# Patient Record
Sex: FEMALE | Race: WHITE | NOT HISPANIC OR LATINO | Employment: FULL TIME | URBAN - METROPOLITAN AREA
[De-identification: names, ages, dates, MRNs, and addresses within clinical notes are randomized per-mention and may not be internally consistent; named-entity substitution may affect disease eponyms.]

---

## 2021-03-12 ENCOUNTER — INITIAL PRENATAL (OUTPATIENT)
Dept: OBSTETRICS AND GYNECOLOGY | Facility: CLINIC | Age: 31
End: 2021-03-12

## 2021-03-12 VITALS
BODY MASS INDEX: 30.66 KG/M2 | DIASTOLIC BLOOD PRESSURE: 84 MMHG | WEIGHT: 219 LBS | SYSTOLIC BLOOD PRESSURE: 140 MMHG | HEIGHT: 71 IN

## 2021-03-12 DIAGNOSIS — Z3A.09 9 WEEKS GESTATION OF PREGNANCY: Primary | ICD-10-CM

## 2021-03-12 DIAGNOSIS — R03.0 ELEVATED BLOOD PRESSURE READING IN OFFICE WITH WHITE COAT SYNDROME, WITHOUT DIAGNOSIS OF HYPERTENSION: ICD-10-CM

## 2021-03-12 LAB
GLUCOSE UR STRIP-MCNC: NEGATIVE MG/DL
PROT UR STRIP-MCNC: NEGATIVE MG/DL

## 2021-03-12 PROCEDURE — 0501F PRENATAL FLOW SHEET: CPT | Performed by: OBSTETRICS & GYNECOLOGY

## 2021-03-12 NOTE — PROGRESS NOTES
"   Initial ob visit     CC- Here for care of pregnancy        Desiree Cheung is a 30 y.o. female, , who presents for her first obstetrical visit.  Her last LMP was Patient's last menstrual period was 2021 (exact date)..    Pt is very anxious today.  NOB packet was reviewed no questions.     She is having nausea, no vomiting.    OB History    Para Term  AB Living   1             SAB TAB Ectopic Molar Multiple Live Births             0      # Outcome Date GA Lbr Mukund/2nd Weight Sex Delivery Anes PTL Lv   1 Current                  Prior obstetric issues, potential pregnancy concerns: no  Family history of genetic issues (includes FOB): no  Prior infections concerning in pregnancy (Rash, fever in last 2 weeks): no  Varicella Hx -no  Prior testing for Cystic Fibrosis Carrier or Sickle Cell Trait- no  Prepregnancy BMI - Body mass index is 30.54 kg/m².  History of STD: no    Additional Pertinent History   Last Pap :   Last Completed Pap Smear       Status Date      PAP SMEAR No completions recorded        History of abnormal Pap smear: no  Family history of uterine, colon, breast, or ovarian cancer: no    Exercises Regularly:   Feelings of Anxiety or Depression: patient visibly agitated, says \"I just don't like doctors\"  Tobacco Usage?: No   Alcohol/Drug Use?: NO  Over the age of 35 at delivery: no  Desires Genetic Screening: uncertain    Patient's preferred name: Desiree    Occupation: Accounting  FOB's name: Abdirahman Cheung     Occupation:     [employer]  History reviewed. No pertinent past medical history.  No current outpatient medications on file.    The additional following portions of the patient's history were reviewed and updated as appropriate: allergies, current medications, past family history, past medical history, past social history, past surgical history and problem list.    Review of Systems   Review of Systems  Current obstetric complaints : Nausea   All systems reviewed and " "otherwise normal.    I have reviewed and agree with the HPI, ROS, and historical information as entered above. Ginna Isaacs MD    /84   Ht 180.3 cm (71\")   Wt 99.3 kg (219 lb)   LMP 2021 (Exact Date)   BMI 30.54 kg/m²     Physical Exam  General Appearance:    Alert, cooperative, in no acute distress,    Head:    Normocephalic, without obvious abnormality, atraumatic   Neck:   No adenopathy, supple, trachea midline, no thyromegaly   Back:     No kyphosis present, no scoliosis present,                       Lungs:     Clear to auscultation,respirations regular, even and     unlabored    Heart:    Regular rhythm and normal rate, normal S1 and S2, no            murmur, no gallop, no rub, no click       Abdomen:     Normal bowel sounds, no masses, no organomegaly, soft        non-tender, non-distended, no guarding, no rebound                 tenderness       Extremities:   Moves all extremities well, no edema, no cyanosis, no         redness   Pulses:   Pulses palpable and equal bilaterally   Skin:   No bleeding, bruising or rash   Lymph nodes:   No palpable adenopathy   Neurologic:   Sensation intact, A&O times 3      Physical Exam has been reviewed and confirmed by Ginna Isaacs MD    Objective   Lab Review   No data reviewed    Imaging   Pelvic ultrasound images independantly reviewed - IUP c/w dates    Assessment/Plan   ASSESSMENT  1. 30 y.o. year old  at 9w  2. Supervision of low risk pregnancy  3. Elevated BP without dx HTN today    PLAN  1. The problem list for pregnancy was initiated today  2. Tests ordered today:  Orders Placed This Encounter   Procedures   • Chlamydia trachomatis, Neisseria gonorrhoeae, PCR w/ confirmation - Urine, Urine, Clean Catch   • Urine Culture - Urine, Urine, Clean Catch   • Urine Drug Screen - Urine, Clean Catch     Please confirm all positive UDS   • HIV-1 / O / 2 Ag / Antibody 4th Generation   • Obstetric Panel   • POC Urinalysis Dipstick     3. Testing " for GC / Chlamydia ordered today  4. Genetic testing reviewed:  and she will consider the information and make a decision at a later date.  5. Information reviewed: exercise in pregnancy, nutrition in pregnancy, weight gain in pregnancy, work and travel restrictions during pregnancy, list of OTC medications acceptable in pregnancy and call coverage groups  6. Discussed new OB precautions for toxoplasmosis, dairy, heavy metals and diet/exercise.      Follow up: 4 week(s)       This note was electronically signed.    Ginna Isaacs MD  March 12, 2021

## 2021-03-15 LAB
ABO GROUP BLD: ABNORMAL
AMPHETAMINES UR QL SCN: NEGATIVE NG/ML
BACTERIA UR CULT: NORMAL
BACTERIA UR CULT: NORMAL
BARBITURATES UR QL SCN: NEGATIVE NG/ML
BASOPHILS # BLD AUTO: 0 X10E3/UL (ref 0–0.2)
BASOPHILS NFR BLD AUTO: 0 %
BENZODIAZ UR QL SCN: NEGATIVE NG/ML
BLD GP AB SCN SERPL QL: NEGATIVE
BZE UR QL SCN: NEGATIVE NG/ML
C TRACH RRNA SPEC QL NAA+PROBE: NEGATIVE
CANNABINOIDS UR QL SCN: NEGATIVE NG/ML
CREAT UR-MCNC: 173.6 MG/DL (ref 20–300)
EOSINOPHIL # BLD AUTO: 0 X10E3/UL (ref 0–0.4)
EOSINOPHIL NFR BLD AUTO: 0 %
ERYTHROCYTE [DISTWIDTH] IN BLOOD BY AUTOMATED COUNT: 12.8 % (ref 11.7–15.4)
HBV SURFACE AG SERPL QL IA: NEGATIVE
HCT VFR BLD AUTO: 40.4 % (ref 34–46.6)
HCV AB S/CO SERPL IA: <0.1 S/CO RATIO (ref 0–0.9)
HGB BLD-MCNC: 13.4 G/DL (ref 11.1–15.9)
HIV 1+2 AB+HIV1 P24 AG SERPL QL IA: NON REACTIVE
IMM GRANULOCYTES # BLD AUTO: 0 X10E3/UL (ref 0–0.1)
IMM GRANULOCYTES NFR BLD AUTO: 0 %
LABORATORY COMMENT REPORT: NORMAL
LYMPHOCYTES # BLD AUTO: 2.6 X10E3/UL (ref 0.7–3.1)
LYMPHOCYTES NFR BLD AUTO: 25 %
MCH RBC QN AUTO: 29.8 PG (ref 26.6–33)
MCHC RBC AUTO-ENTMCNC: 33.2 G/DL (ref 31.5–35.7)
MCV RBC AUTO: 90 FL (ref 79–97)
METHADONE UR QL SCN: NEGATIVE NG/ML
MONOCYTES # BLD AUTO: 0.4 X10E3/UL (ref 0.1–0.9)
MONOCYTES NFR BLD AUTO: 4 %
N GONORRHOEA RRNA SPEC QL NAA+PROBE: NEGATIVE
NEUTROPHILS # BLD AUTO: 7.3 X10E3/UL (ref 1.4–7)
NEUTROPHILS NFR BLD AUTO: 71 %
OPIATES UR QL SCN: NEGATIVE NG/ML
OXYCODONE+OXYMORPHONE UR QL SCN: NEGATIVE NG/ML
PCP UR QL: NEGATIVE NG/ML
PH UR: 5.8 [PH] (ref 4.5–8.9)
PLATELET # BLD AUTO: 154 X10E3/UL (ref 150–450)
PROPOXYPH UR QL SCN: NEGATIVE NG/ML
RBC # BLD AUTO: 4.5 X10E6/UL (ref 3.77–5.28)
RH BLD: POSITIVE
RPR SER QL: NON REACTIVE
RUBV IGG SERPL IA-ACNC: 3.65 INDEX
WBC # BLD AUTO: 10.4 X10E3/UL (ref 3.4–10.8)

## 2021-04-09 ENCOUNTER — ROUTINE PRENATAL (OUTPATIENT)
Dept: OBSTETRICS AND GYNECOLOGY | Facility: CLINIC | Age: 31
End: 2021-04-09

## 2021-04-09 VITALS — SYSTOLIC BLOOD PRESSURE: 160 MMHG | BODY MASS INDEX: 30.4 KG/M2 | WEIGHT: 218 LBS | DIASTOLIC BLOOD PRESSURE: 100 MMHG

## 2021-04-09 DIAGNOSIS — O16.2 ELEVATED BLOOD PRESSURE AFFECTING PREGNANCY IN SECOND TRIMESTER, ANTEPARTUM: ICD-10-CM

## 2021-04-09 DIAGNOSIS — Z3A.13 13 WEEKS GESTATION OF PREGNANCY: Primary | ICD-10-CM

## 2021-04-09 LAB
GLUCOSE UR STRIP-MCNC: NEGATIVE MG/DL
PROT UR STRIP-MCNC: NEGATIVE MG/DL

## 2021-04-09 PROCEDURE — 0502F SUBSEQUENT PRENATAL CARE: CPT | Performed by: NURSE PRACTITIONER

## 2021-04-09 RX ORDER — NIFEDIPINE 30 MG/1
30 TABLET, EXTENDED RELEASE ORAL DAILY
Qty: 30 TABLET | Refills: 6 | Status: SHIPPED | OUTPATIENT
Start: 2021-04-09 | End: 2021-05-29

## 2021-04-09 NOTE — PROGRESS NOTES
OB FOLLOW UP    Desiree Cheung is a 30 y.o.  13w6d patient being seen today for her obstetrical follow up visit. Patient reports no complaints. Pt gets very nervous at the doc office, and states that is why BP elevated. She denies h/o HTN. No c/o    Her prenatal care is complicated by (and status) :    Patient Active Problem List   Diagnosis   • 9 weeks gestation of pregnancy   • Elevated blood pressure reading in office with white coat syndrome, without diagnosis of hypertension       ROS -   Patient Reports : No Problems  Patient Denies: Loss of Fluid, Vaginal Spotting, Vision Changes, Headaches and Cramping/Contractions   Fetal Movement : not at this gestation      /100   Wt 98.9 kg (218 lb)   LMP 2021 (Exact Date)   BMI 30.40 kg/m²     Ultrasound Today: no    EXAM:  Vitals: See prenatal flowsheet       Urine glucose/protein: See prenatal flowsheet         Assessment    1. Pregnancy at 13w6d  2. Fetal status reassuring   3. Elevated blood pressure    Problem List Items Addressed This Visit     None      Visit Diagnoses     13 weeks gestation of pregnancy    -  Primary    Relevant Orders    POC Urinalysis Dipstick, Automated (Completed)    Comprehensive Metabolic Panel    Protein, Urine, 24 Hour - Urine, Clean Catch    Elevated blood pressure affecting pregnancy in second trimester, antepartum        Relevant Orders    Comprehensive Metabolic Panel    Protein, Urine, 24 Hour - Urine, Clean Catch          Plan    1. Start Procardia XL 30 mg qd, check baseline labs, 24 hr urine, start baby ASA  2. Declines cf DNA testing, but may be interested in Quad screen  3. RTC in 2 weeks to follow up on blood pressure.         RUTH ANN Rushing  2021

## 2021-04-10 LAB
ALBUMIN SERPL-MCNC: 4.1 G/DL (ref 3.5–5.2)
ALBUMIN/GLOB SERPL: 1.6 G/DL
ALP SERPL-CCNC: 51 U/L (ref 39–117)
ALT SERPL-CCNC: 26 U/L (ref 1–33)
AST SERPL-CCNC: 21 U/L (ref 1–32)
BILIRUB SERPL-MCNC: 0.3 MG/DL (ref 0–1.2)
BUN SERPL-MCNC: 6 MG/DL (ref 6–20)
BUN/CREAT SERPL: 9.4 (ref 7–25)
CALCIUM SERPL-MCNC: 9.7 MG/DL (ref 8.6–10.5)
CHLORIDE SERPL-SCNC: 104 MMOL/L (ref 98–107)
CO2 SERPL-SCNC: 23.5 MMOL/L (ref 22–29)
CREAT SERPL-MCNC: 0.64 MG/DL (ref 0.57–1)
GLOBULIN SER CALC-MCNC: 2.5 GM/DL
GLUCOSE SERPL-MCNC: 101 MG/DL (ref 65–99)
POTASSIUM SERPL-SCNC: 4.2 MMOL/L (ref 3.5–5.2)
PROT SERPL-MCNC: 6.6 G/DL (ref 6–8.5)
SODIUM SERPL-SCNC: 140 MMOL/L (ref 136–145)

## 2021-04-14 LAB
PROT 24H UR-MRATE: 229 MG/24 HR (ref 30–150)
PROT UR-MCNC: 12.4 MG/DL

## 2021-04-23 ENCOUNTER — ROUTINE PRENATAL (OUTPATIENT)
Dept: OBSTETRICS AND GYNECOLOGY | Facility: CLINIC | Age: 31
End: 2021-04-23

## 2021-04-23 VITALS — DIASTOLIC BLOOD PRESSURE: 92 MMHG | SYSTOLIC BLOOD PRESSURE: 160 MMHG | WEIGHT: 218 LBS | BODY MASS INDEX: 30.4 KG/M2

## 2021-04-23 DIAGNOSIS — Z3A.15 15 WEEKS GESTATION OF PREGNANCY: Primary | ICD-10-CM

## 2021-04-23 DIAGNOSIS — R03.0 ELEVATED BLOOD PRESSURE READING IN OFFICE WITH WHITE COAT SYNDROME, WITHOUT DIAGNOSIS OF HYPERTENSION: ICD-10-CM

## 2021-04-23 DIAGNOSIS — Z34.82 PRENATAL CARE, SUBSEQUENT PREGNANCY, SECOND TRIMESTER: ICD-10-CM

## 2021-04-23 LAB
GLUCOSE UR STRIP-MCNC: NEGATIVE MG/DL
PROT UR STRIP-MCNC: NEGATIVE MG/DL

## 2021-04-23 PROCEDURE — 0502F SUBSEQUENT PRENATAL CARE: CPT | Performed by: OBSTETRICS & GYNECOLOGY

## 2021-04-23 NOTE — PROGRESS NOTES
OB FOLLOW UP    Desiree hCeung is a 30 y.o.  15w6d patient being seen today for her obstetrical follow up visit. Patient reports no complaints. Pt feels well. BP @ home this AM before coming to appt was 123/72. She keeps a BP log and all wnl so patient has not taken BP med.    Her prenatal care is complicated by (and status) :    Patient Active Problem List   Diagnosis   • 9 weeks gestation of pregnancy   • Elevated blood pressure reading in office with white coat syndrome, without diagnosis of hypertension   • 15 weeks gestation of pregnancy   • Prenatal care, subsequent pregnancy, second trimester       ROS -   Patient Reports : No Problems  Patient Denies: Loss of Fluid, Vaginal Spotting, Vision Changes, Headaches and Cramping/Contractions   Fetal Movement : not at this gestation      /92   Wt 98.9 kg (218 lb)   LMP 2021 (Exact Date)   BMI 30.40 kg/m²     Ultrasound Today: no    EXAM:  Vitals: See prenatal flowsheet   Abdomen: See prenatal flowsheet   Urine glucose/protein: See prenatal flowsheet   Pelvic: See prenatal flowsheet     Assessment    1. Pregnancy at 15w6d  2. Fetal status reassuring     Problem List Items Addressed This Visit     Elevated blood pressure reading in office with white coat syndrome, without diagnosis of hypertension    15 weeks gestation of pregnancy - Primary    Relevant Orders    POC Urinalysis Dipstick, Automated (Completed)    Maternal Screen 4    Prenatal care, subsequent pregnancy, second trimester    Relevant Orders    Maternal Screen 4          Plan    1. Fetal movement/ Labor Precautions  2. Wants AFP today  3. Discussed elevated BP.  Patient is visibly trembling and she is bouncing her leg up and down.  States fear of doctor's office from being held down and given shots as a kid.  We discussed possibly treating anxiety.  Encouraged her to take benadryl prior to appt. Next visit.  If it works, I can given vistaril.  Also, she says she does better if  her friend, Kassandra, can come to appt.  She will come next time as sono visit.  Activity recommendation : 150 minutes/week of moderate intensity aerobic activity unless we limit for bleeding, hypertension or other pregnancy complication   Follow Up: Return in about 4 weeks (around 5/21/2021) for WITH SONO.  Reviewed Pre-eclampsia signs/symptoms        Ginna Isaacs MD  04/23/2021

## 2021-04-26 LAB
2ND TRIMESTER 4 SCREEN SERPL-IMP: NORMAL
AFP ADJ MOM SERPL: 1.01
AFP SERPL-MCNC: 27.3 NG/ML
AGE AT DELIVERY: 32.8 YR
FET TS 18 RISK FROM MAT AGE: NORMAL
FET TS 21 RISK FROM MAT AGE: 460
GA METHOD: NORMAL
GA: 15.9 WEEKS
HCG ADJ MOM SERPL: 1.5
HCG SERPL-ACNC: NORMAL MIU/ML
IDDM PATIENT QL: NO
INHIBIN A ADJ MOM SERPL: 0.8
INHIBIN A SERPL-MCNC: 103.71 PG/ML
MULTIPLE PREGNANCY: NO
NEURAL TUBE DEFECT RISK FETUS: NORMAL %
SERVICE CMNT-IMP: NORMAL
TS 18 RISK FETUS: NORMAL
TS 21 RISK FETUS: 9714
U ESTRIOL ADJ MOM SERPL: 1.4
U ESTRIOL SERPL-MCNC: 1.18 NG/ML

## 2021-05-21 ENCOUNTER — ROUTINE PRENATAL (OUTPATIENT)
Dept: OBSTETRICS AND GYNECOLOGY | Facility: CLINIC | Age: 31
End: 2021-05-21

## 2021-05-21 VITALS — DIASTOLIC BLOOD PRESSURE: 82 MMHG | WEIGHT: 217 LBS | BODY MASS INDEX: 30.27 KG/M2 | SYSTOLIC BLOOD PRESSURE: 128 MMHG

## 2021-05-21 DIAGNOSIS — R03.0 ELEVATED BLOOD PRESSURE READING IN OFFICE WITH WHITE COAT SYNDROME, WITHOUT DIAGNOSIS OF HYPERTENSION: ICD-10-CM

## 2021-05-21 DIAGNOSIS — Z3A.19 19 WEEKS GESTATION OF PREGNANCY: Primary | ICD-10-CM

## 2021-05-21 LAB
EXPIRATION DATE: NORMAL
GLUCOSE UR STRIP-MCNC: NEGATIVE MG/DL
Lab: NORMAL
PROT UR STRIP-MCNC: NEGATIVE MG/DL

## 2021-05-21 PROCEDURE — 0502F SUBSEQUENT PRENATAL CARE: CPT | Performed by: OBSTETRICS & GYNECOLOGY

## 2021-05-28 PROBLEM — Z3A.15 15 WEEKS GESTATION OF PREGNANCY: Status: RESOLVED | Noted: 2021-04-23 | Resolved: 2021-05-28

## 2021-05-28 PROBLEM — Z3A.19 19 WEEKS GESTATION OF PREGNANCY: Status: ACTIVE | Noted: 2021-05-28

## 2021-05-28 PROBLEM — Z3A.09 9 WEEKS GESTATION OF PREGNANCY: Status: RESOLVED | Noted: 2021-03-12 | Resolved: 2021-05-28

## 2021-06-25 ENCOUNTER — ROUTINE PRENATAL (OUTPATIENT)
Dept: OBSTETRICS AND GYNECOLOGY | Facility: CLINIC | Age: 31
End: 2021-06-25

## 2021-06-25 VITALS — SYSTOLIC BLOOD PRESSURE: 136 MMHG | DIASTOLIC BLOOD PRESSURE: 84 MMHG | BODY MASS INDEX: 30.63 KG/M2 | WEIGHT: 219.6 LBS

## 2021-06-25 DIAGNOSIS — Z3A.25 25 WEEKS GESTATION OF PREGNANCY: Primary | ICD-10-CM

## 2021-06-25 LAB
EXPIRATION DATE: 0
GLUCOSE UR STRIP-MCNC: NEGATIVE MG/DL
Lab: 0
PROT UR STRIP-MCNC: NEGATIVE MG/DL

## 2021-06-25 PROCEDURE — 0502F SUBSEQUENT PRENATAL CARE: CPT | Performed by: OBSTETRICS & GYNECOLOGY

## 2021-06-25 RX ORDER — PRENATAL VIT/IRON FUM/FOLIC AC 27MG-0.8MG
1 TABLET ORAL DAILY
COMMUNITY

## 2021-06-25 NOTE — PROGRESS NOTES
OB FOLLOW UP    Desiree Cheung is a 30 y.o.  24w6d patient being seen today for her obstetrical follow up visit. Patient reports no complaints. 28 week prenatal visit instruction sheet discussed. Pt verbalized understanding.    Her prenatal care is complicated by (and status) :    Patient Active Problem List   Diagnosis   • Elevated blood pressure reading in office with white coat syndrome, without diagnosis of hypertension   • Prenatal care, subsequent pregnancy, second trimester   • 19 weeks gestation of pregnancy       ROS -   Patient Reports : No Problems  Patient Denies: Loss of Fluid, Vaginal Spotting, Vision Changes, Headaches and Cramping/Contractions   Fetal Movement : normal      /84   Wt 99.6 kg (219 lb 9.6 oz)   LMP 2021 (Exact Date)   BMI 30.63 kg/m²     Ultrasound Today: no    EXAM:  Vitals: See prenatal flowsheet   Abdomen: See prenatal flowsheet   Urine glucose/protein: See prenatal flowsheet   Pelvic: See prenatal flowsheet     Assessment    Diagnoses and all orders for this visit:    1. 25 weeks gestation of pregnancy (Primary)  -     POC Glucose, Urine, Qualitative, Dipstick  -     POC Protein, Urine, Qualitative, Dipstick        1. Pregnancy at 24w6d  2. Fetal status reassuring     Problem List Items Addressed This Visit     None      Visit Diagnoses     25 weeks gestation of pregnancy    -  Primary    Relevant Orders    POC Glucose, Urine, Qualitative, Dipstick (Completed)    POC Protein, Urine, Qualitative, Dipstick (Completed)          Plan    1. Fetal movement/ Labor Precautions  Reviewed routine prenatal care with the office and educational materials given  Patient is on Prenatal vitamins  Activity recommendation : 150 minutes/week of moderate intensity aerobic activity unless we limit for bleeding, hypertension or other pregnancy complication   Reviewed Pre-eclampsia signs/symptoms  Follow Up: Return in about 3 weeks (around 2021) for Next scheduled follow  up.        Ginna Isaacs MD  06/25/2021

## 2021-07-30 ENCOUNTER — ROUTINE PRENATAL (OUTPATIENT)
Dept: OBSTETRICS AND GYNECOLOGY | Facility: CLINIC | Age: 31
End: 2021-07-30

## 2021-07-30 VITALS — WEIGHT: 215 LBS | DIASTOLIC BLOOD PRESSURE: 88 MMHG | BODY MASS INDEX: 29.99 KG/M2 | SYSTOLIC BLOOD PRESSURE: 136 MMHG

## 2021-07-30 DIAGNOSIS — Z34.82 PRENATAL CARE, SUBSEQUENT PREGNANCY, SECOND TRIMESTER: ICD-10-CM

## 2021-07-30 DIAGNOSIS — R03.0 ELEVATED BLOOD PRESSURE READING IN OFFICE WITH WHITE COAT SYNDROME, WITHOUT DIAGNOSIS OF HYPERTENSION: ICD-10-CM

## 2021-07-30 DIAGNOSIS — Z3A.29 29 WEEKS GESTATION OF PREGNANCY: Primary | ICD-10-CM

## 2021-07-30 DIAGNOSIS — O10.919 CHRONIC HYPERTENSION AFFECTING PREGNANCY: ICD-10-CM

## 2021-07-30 PROBLEM — Z3A.19 19 WEEKS GESTATION OF PREGNANCY: Status: RESOLVED | Noted: 2021-05-28 | Resolved: 2021-07-30

## 2021-07-30 LAB
GLUCOSE UR STRIP-MCNC: NEGATIVE MG/DL
PROT UR STRIP-MCNC: NEGATIVE MG/DL

## 2021-07-30 PROCEDURE — 90715 TDAP VACCINE 7 YRS/> IM: CPT | Performed by: OBSTETRICS & GYNECOLOGY

## 2021-07-30 PROCEDURE — 90471 IMMUNIZATION ADMIN: CPT | Performed by: OBSTETRICS & GYNECOLOGY

## 2021-07-30 PROCEDURE — 0502F SUBSEQUENT PRENATAL CARE: CPT | Performed by: OBSTETRICS & GYNECOLOGY

## 2021-07-31 LAB
BLD GP AB SCN SERPL QL: NEGATIVE
ERYTHROCYTE [DISTWIDTH] IN BLOOD BY AUTOMATED COUNT: 12.4 % (ref 12.3–15.4)
GLUCOSE 1H P 50 G GLC PO SERPL-MCNC: 87 MG/DL (ref 65–139)
HCT VFR BLD AUTO: 39.1 % (ref 34–46.6)
HGB BLD-MCNC: 13.3 G/DL (ref 12–15.9)
MCH RBC QN AUTO: 30.2 PG (ref 26.6–33)
MCHC RBC AUTO-ENTMCNC: 34 G/DL (ref 31.5–35.7)
MCV RBC AUTO: 88.9 FL (ref 79–97)
PLATELET # BLD AUTO: 138 10*3/MM3 (ref 140–450)
RBC # BLD AUTO: 4.4 10*6/MM3 (ref 3.77–5.28)
WBC # BLD AUTO: 9.93 10*3/MM3 (ref 3.4–10.8)

## 2021-08-18 ENCOUNTER — ROUTINE PRENATAL (OUTPATIENT)
Dept: OBSTETRICS AND GYNECOLOGY | Facility: CLINIC | Age: 31
End: 2021-08-18

## 2021-08-18 VITALS — WEIGHT: 213.2 LBS | BODY MASS INDEX: 29.74 KG/M2 | SYSTOLIC BLOOD PRESSURE: 128 MMHG | DIASTOLIC BLOOD PRESSURE: 82 MMHG

## 2021-08-18 DIAGNOSIS — Z3A.32 32 WEEKS GESTATION OF PREGNANCY: Primary | ICD-10-CM

## 2021-08-18 PROCEDURE — 0502F SUBSEQUENT PRENATAL CARE: CPT | Performed by: NURSE PRACTITIONER

## 2021-08-18 RX ORDER — ASPIRIN 81 MG/1
81 TABLET ORAL DAILY
COMMUNITY
End: 2021-08-19 | Stop reason: ALTCHOICE

## 2021-08-18 NOTE — PROGRESS NOTES
OB FOLLOW UP  CC- Here for care of pregnancy        Desiree Cheung is a 30 y.o.  32w4d patient being seen today for her obstetrical follow up visit. Patient reports no complaints.     She denies LOF, vaginal spotting, headaches, vision changes, swelling or sonali dixon/contractions.  She reports adequate fetal movements, >10 movements in 10 hours.     Her prenatal care is complicated by (and status) :    Patient Active Problem List   Diagnosis   • Elevated blood pressure reading in office with white coat syndrome, without diagnosis of hypertension   • Prenatal care, subsequent pregnancy, second trimester   • 29 weeks gestation of pregnancy   • Chronic hypertension affecting pregnancy     Ultrasound Today: Yes, f/u growth w/ BPP.  Findings showed AC 6%.  I have personally evaluated the U/S and agree with the findings. RUTH ANN Espino    ROS -   Patient Reports : No Problems  Patient Denies: Loss of Fluid, Vaginal Spotting, Vision Changes, Headaches, Nausea , Vomiting , Contractions and Epigastric pain  Fetal Movement : >10 movements in 10 hours  All other systems reviewed and are negative.       The additional following portions of the patient's history were reviewed and updated as appropriate: allergies, current medications, past family history, past medical history, past social history, past surgical history and problem list.    I have reviewed and agree with the HPI, ROS, and historical information as entered above. RUTH ANN Espino    /82   Wt 96.7 kg (213 lb 3.2 oz)   LMP 2021 (Exact Date)   BMI 29.74 kg/m²       EXAM:     FHT: wnl BPM   Uterine Size: see US  Pelvic Exam: deferred    Urine glucose/protein: See prenatal flowsheet       Assessment and Plan    Problem List Items Addressed This Visit     None      Visit Diagnoses     32 weeks gestation of pregnancy    -  Primary    Relevant Orders    [facility]          1. Pregnancy at 32w4d  2. Fetal status  reassuring.  Enc rest, good po intake, push flds.  3. Activity and Exercise discussed.  Rev daily [facility], preecl prec, MARIA ELENA prec.  Return in about 1 week (around 8/25/2021) for PDC for AC 6% then SIRI.    Heaven Winter, APRN  08/18/2021

## 2021-08-19 ENCOUNTER — HOSPITAL ENCOUNTER (OUTPATIENT)
Dept: WOMENS IMAGING | Facility: HOSPITAL | Age: 31
Discharge: HOME | End: 2021-08-19
Admitting: NURSE PRACTITIONER

## 2021-08-19 ENCOUNTER — OFFICE VISIT (OUTPATIENT)
Dept: OBSTETRICS AND GYNECOLOGY | Facility: HOSPITAL | Age: 31
End: 2021-08-19

## 2021-08-19 VITALS
SYSTOLIC BLOOD PRESSURE: 131 MMHG | WEIGHT: 214 LBS | DIASTOLIC BLOOD PRESSURE: 75 MMHG | HEIGHT: 70 IN | BODY MASS INDEX: 30.64 KG/M2

## 2021-08-19 DIAGNOSIS — Z3A.32 32 WEEKS GESTATION OF PREGNANCY: ICD-10-CM

## 2021-08-19 DIAGNOSIS — O10.919 CHRONIC HYPERTENSION AFFECTING PREGNANCY: Primary | ICD-10-CM

## 2021-08-19 PROCEDURE — 76811 OB US DETAILED SNGL FETUS: CPT | Performed by: OBSTETRICS & GYNECOLOGY

## 2021-08-19 PROCEDURE — 76811 OB US DETAILED SNGL FETUS: CPT

## 2021-08-19 RX ORDER — ASPIRIN 81 MG/1
81 TABLET, CHEWABLE ORAL DAILY
COMMUNITY

## 2021-08-19 NOTE — PROGRESS NOTES
Documentation of the ultasound findings, images, and interpretations will be available in the patient's Viewpoint report located in the Chart Review Imaging tab in [facility].

## 2021-08-20 ENCOUNTER — ROUTINE PRENATAL (OUTPATIENT)
Dept: OBSTETRICS AND GYNECOLOGY | Facility: CLINIC | Age: 31
End: 2021-08-20

## 2021-08-20 VITALS — WEIGHT: 214 LBS | SYSTOLIC BLOOD PRESSURE: 120 MMHG | DIASTOLIC BLOOD PRESSURE: 72 MMHG | BODY MASS INDEX: 30.93 KG/M2

## 2021-08-20 DIAGNOSIS — Z3A.32 32 WEEKS GESTATION OF PREGNANCY: Primary | ICD-10-CM

## 2021-08-20 DIAGNOSIS — Z34.82 PRENATAL CARE, SUBSEQUENT PREGNANCY, SECOND TRIMESTER: ICD-10-CM

## 2021-08-20 DIAGNOSIS — O16.3 ELEVATED BLOOD PRESSURE AFFECTING PREGNANCY IN THIRD TRIMESTER, ANTEPARTUM: ICD-10-CM

## 2021-08-20 DIAGNOSIS — R03.0 ELEVATED BLOOD PRESSURE READING IN OFFICE WITH WHITE COAT SYNDROME, WITHOUT DIAGNOSIS OF HYPERTENSION: ICD-10-CM

## 2021-08-20 PROBLEM — O10.919 CHRONIC HYPERTENSION AFFECTING PREGNANCY: Status: RESOLVED | Noted: 2021-07-30 | Resolved: 2021-08-20

## 2021-08-20 LAB
GLUCOSE UR STRIP-MCNC: NEGATIVE MG/DL
PROT UR STRIP-MCNC: NEGATIVE MG/DL

## 2021-08-20 PROCEDURE — 0502F SUBSEQUENT PRENATAL CARE: CPT | Performed by: OBSTETRICS & GYNECOLOGY

## 2021-08-20 NOTE — PROGRESS NOTES
OB FOLLOW UP    Desiree Cheung is a 30 y.o.  32w6d patient being seen today for her obstetrical follow up visit. Patient reports no complaints. Pt feels well (stressed) good fm, no other c/p. BPP  @ PDC 21.    Her prenatal care is complicated by (and status) :    Patient Active Problem List   Diagnosis   • Elevated blood pressure reading in office with white coat syndrome, without diagnosis of hypertension   • Prenatal care, subsequent pregnancy, second trimester   • 32 weeks gestation of pregnancy   • Elevated blood pressure affecting pregnancy in third trimester, antepartum       ROS -   Patient Reports : No Problems  Patient Denies: Loss of Fluid, Vaginal Spotting, Vision Changes, Headaches and Cramping/Contractions   Fetal Movement : normal      /72   Wt 97.1 kg (214 lb)   LMP 2021 (Exact Date)   BMI 30.93 kg/m²     Ultrasound Today: no    EXAM:  Vitals: See prenatal flowsheet   Abdomen: See prenatal flowsheet   Urine glucose/protein: See prenatal flowsheet   Pelvic: See prenatal flowsheet     Assessment    Diagnoses and all orders for this visit:    1. 32 weeks gestation of pregnancy (Primary)  -     POC Urinalysis Dipstick, Automated    2. Elevated blood pressure affecting pregnancy in third trimester, antepartum  -     US Ob Follow Up Transabdominal Approach; Future    3. Elevated blood pressure reading in office with white coat syndrome, without diagnosis of hypertension    4. Prenatal care, subsequent pregnancy, second trimester        1. Pregnancy at 32w6d  2. Fetal status reassuring     Problem List Items Addressed This Visit     Elevated blood pressure reading in office with white coat syndrome, without diagnosis of hypertension    Prenatal care, subsequent pregnancy, second trimester    32 weeks gestation of pregnancy - Primary    Relevant Orders    POC Urinalysis Dipstick, Automated (Completed)    Elevated blood pressure affecting pregnancy in third trimester, antepartum     Relevant Orders    US Ob Follow Up Transabdominal Approach          Plan    1. Fetal movement/ Labor Precautions  2. Reviewed growth normal and small AC, will repeat 4 weeks  Discussed/encouraged social distancing/COVID19 precautions; encouraged vaccination when able  Diet/exercise precautions  Follow Up: Return in about 2 weeks (around 9/3/2021) for Next scheduled follow up.        Ginna Isaacs MD  2021

## 2021-09-03 ENCOUNTER — ROUTINE PRENATAL (OUTPATIENT)
Dept: OBSTETRICS AND GYNECOLOGY | Facility: CLINIC | Age: 31
End: 2021-09-03

## 2021-09-03 VITALS — BODY MASS INDEX: 30.93 KG/M2 | WEIGHT: 214 LBS | SYSTOLIC BLOOD PRESSURE: 128 MMHG | DIASTOLIC BLOOD PRESSURE: 80 MMHG

## 2021-09-03 DIAGNOSIS — Z3A.35 35 WEEKS GESTATION OF PREGNANCY: Primary | ICD-10-CM

## 2021-09-03 LAB
GLUCOSE UR STRIP-MCNC: ABNORMAL MG/DL
PROT UR STRIP-MCNC: NEGATIVE MG/DL

## 2021-09-03 PROCEDURE — 0502F SUBSEQUENT PRENATAL CARE: CPT | Performed by: OBSTETRICS & GYNECOLOGY

## 2021-09-03 NOTE — PROGRESS NOTES
OB FOLLOW UP    Desiree Cheung is a 30 y.o.  34w6d patient being seen today for her obstetrical follow up visit. Patient reports no complaints.     Her prenatal care is complicated by (and status) :    Patient Active Problem List   Diagnosis   • Elevated blood pressure reading in office with white coat syndrome, without diagnosis of hypertension   • Prenatal care, subsequent pregnancy, second trimester   • 32 weeks gestation of pregnancy   • Elevated blood pressure affecting pregnancy in third trimester, antepartum   • 35 weeks gestation of pregnancy       ROS -   Patient Reports : no complaints  Patient Denies: Loss of Fluid, Vaginal Spotting and Vision Changes  Fetal Movement : normal      /80   Wt 97.1 kg (214 lb)   LMP 2021 (Exact Date)   BMI 30.93 kg/m²     Ultrasound Today: no    EXAM:  Vitals: See prenatal flowsheet   Abdomen: See prenatal flowsheet   Urine glucose/protein: See prenatal flowsheet   Pelvic: See prenatal flowsheet     Non Stress Test: minutes >20min  non-stress test: NST: Reactive  indication: Hypertension  category: Category I      Assessment    Diagnoses and all orders for this visit:    1. 35 weeks gestation of pregnancy (Primary)  -     POC Urinalysis Dipstick    Other orders  -     SCANNED - PROCEDURE        1. Pregnancy at 34w6d  2. Fetal status reassuring     Problem List Items Addressed This Visit     35 weeks gestation of pregnancy - Primary    Relevant Orders    POC Urinalysis Dipstick (Completed)          Plan    1. Fetal movement/ Labor Precautions  2. I want to believe that patient's hypertension is just whitecoat syndrome.  However with baby's growth being somewhat lagging, I think we should monitor a little closer anyway and so we will continue doing NSTs weekly, BPP and growth in 2 weeks.  Diet/exercise precautions  Reviewed Pre-eclampsia signs/symptoms  Follow Up: Return in about 2 weeks (around 2021) for WITH TALI Isaacs,  MD  09/03/2021

## 2021-09-10 ENCOUNTER — ROUTINE PRENATAL (OUTPATIENT)
Dept: OBSTETRICS AND GYNECOLOGY | Facility: CLINIC | Age: 31
End: 2021-09-10

## 2021-09-10 VITALS — DIASTOLIC BLOOD PRESSURE: 92 MMHG | BODY MASS INDEX: 31.22 KG/M2 | WEIGHT: 216 LBS | SYSTOLIC BLOOD PRESSURE: 130 MMHG

## 2021-09-10 DIAGNOSIS — R03.0 ELEVATED BLOOD PRESSURE READING IN OFFICE WITH WHITE COAT SYNDROME, WITHOUT DIAGNOSIS OF HYPERTENSION: ICD-10-CM

## 2021-09-10 DIAGNOSIS — Z3A.35 35 WEEKS GESTATION OF PREGNANCY: Primary | ICD-10-CM

## 2021-09-10 DIAGNOSIS — O16.3 ELEVATED BLOOD PRESSURE AFFECTING PREGNANCY IN THIRD TRIMESTER, ANTEPARTUM: ICD-10-CM

## 2021-09-10 LAB
GLUCOSE UR STRIP-MCNC: NEGATIVE MG/DL
PROT UR STRIP-MCNC: NEGATIVE MG/DL

## 2021-09-10 PROCEDURE — 0502F SUBSEQUENT PRENATAL CARE: CPT | Performed by: OBSTETRICS & GYNECOLOGY

## 2021-09-10 NOTE — PROGRESS NOTES
OB FOLLOW UP    Desiree Cheung is a 30 y.o.  35w6d patient being seen today for her obstetrical follow up visit. Patient reports no complaints.     Her prenatal care is complicated by (and status) :    Patient Active Problem List   Diagnosis   • Elevated blood pressure reading in office with white coat syndrome, without diagnosis of hypertension   • Prenatal care, subsequent pregnancy, second trimester   • 32 weeks gestation of pregnancy   • Elevated blood pressure affecting pregnancy in third trimester, antepartum   • 35 weeks gestation of pregnancy       ROS -   Patient Reports : No Problems  Patient Denies: Loss of Fluid, Vaginal Spotting and Vision Changes  Fetal Movement : normal      /92   Wt 98 kg (216 lb)   LMP 2021 (Exact Date)   BMI 31.22 kg/m²     Ultrasound Today: no    EXAM:  Vitals: See prenatal flowsheet   Abdomen: See prenatal flowsheet   Urine glucose/protein: See prenatal flowsheet   Pelvic: See prenatal flowsheet     Assessment    Diagnoses and all orders for this visit:    1. 35 weeks gestation of pregnancy (Primary)  -     POC Urinalysis Dipstick  -     Group B Streptococcus Culture - Swab, Vaginal/Rectum  -     Preeclampsia Panel  -     CBC (No Diff)  -     US Ob Follow Up Transabdominal Approach; Future  -     US Fetal Biophysical Profile;Without Non-Stress Testing; Future    2. Elevated blood pressure reading in office with white coat syndrome, without diagnosis of hypertension  -     Protein, Urine, 24 Hour - Urine, Clean Catch; Future  -     Creatinine, Urine, 24 Hour - Urine, Clean Catch; Future    3. Elevated blood pressure affecting pregnancy in third trimester, antepartum        1. Pregnancy at 35w6d  2. Fetal status reassuring     Problem List Items Addressed This Visit     Elevated blood pressure reading in office with white coat syndrome, without diagnosis of hypertension    Relevant Orders    Protein, Urine, 24 Hour - Urine, Clean Catch    Creatinine, Urine,  24 Hour - Urine, Clean Catch    Elevated blood pressure affecting pregnancy in third trimester, antepartum    35 weeks gestation of pregnancy - Primary    Relevant Orders    POC Urinalysis Dipstick (Completed)    Group B Streptococcus Culture - Swab, Vaginal/Rectum    Preeclampsia Panel    CBC (No Diff)    US Ob Follow Up Transabdominal Approach    US Fetal Biophysical Profile;Without Non-Stress Testing          Plan    1. Fetal movement/ Labor Precautions  2. BP progressively increased at visit, but patient visibly shaking and worrying about work.  She states BPs at home 100's/70's.  PEP, CBC today, will bring 24hr back Mon and f/u for sono/BPP on Tues and BP check.  3. GBS done.        Ginna Isaacs MD  09/10/2021

## 2021-09-13 LAB
ALT SERPL-CCNC: 12 IU/L (ref 0–32)
AST SERPL-CCNC: 17 IU/L (ref 0–40)
BASOPHILS # BLD AUTO: 0 X10E3/UL (ref 0–0.2)
BASOPHILS NFR BLD AUTO: 0 %
BUN SERPL-MCNC: 7 MG/DL (ref 6–20)
CREAT SERPL-MCNC: 0.54 MG/DL (ref 0.57–1)
CREAT UR-MCNC: ABNORMAL MG/DL
EOSINOPHIL # BLD AUTO: 0 X10E3/UL (ref 0–0.4)
EOSINOPHIL NFR BLD AUTO: 0 %
ERYTHROCYTE [DISTWIDTH] IN BLOOD BY AUTOMATED COUNT: 13.1 % (ref 11.7–15.4)
HCT VFR BLD AUTO: 43.6 % (ref 34–46.6)
HGB BLD-MCNC: 14.3 G/DL (ref 11.1–15.9)
IMM GRANULOCYTES # BLD AUTO: 0 X10E3/UL (ref 0–0.1)
IMM GRANULOCYTES NFR BLD AUTO: 0 %
LDH SERPL-CCNC: 140 IU/L (ref 119–226)
LYMPHOCYTES # BLD AUTO: 2.3 X10E3/UL (ref 0.7–3.1)
LYMPHOCYTES NFR BLD AUTO: 23 %
MCH RBC QN AUTO: 29.9 PG (ref 26.6–33)
MCHC RBC AUTO-ENTMCNC: 32.8 G/DL (ref 31.5–35.7)
MCV RBC AUTO: 91 FL (ref 79–97)
MICROALBUMIN UR-MCNC: ABNORMAL
MONOCYTES # BLD AUTO: 0.5 X10E3/UL (ref 0.1–0.9)
MONOCYTES NFR BLD AUTO: 5 %
NEUTROPHILS # BLD AUTO: 7.1 X10E3/UL (ref 1.4–7)
NEUTROPHILS NFR BLD AUTO: 72 %
PLATELET # BLD AUTO: 131 X10E3/UL (ref 150–450)
RBC # BLD AUTO: 4.78 X10E6/UL (ref 3.77–5.28)
URATE SERPL-MCNC: 3.6 MG/DL (ref 2.6–6.2)
WBC # BLD AUTO: 10 X10E3/UL (ref 3.4–10.8)

## 2021-09-13 NOTE — PROGRESS NOTES
Likely c/w gestational thrombocytopenia.  Desiree Vasquez is coming Tues for BPP/growth and recheck BP

## 2021-09-14 ENCOUNTER — ROUTINE PRENATAL (OUTPATIENT)
Dept: OBSTETRICS AND GYNECOLOGY | Facility: CLINIC | Age: 31
End: 2021-09-14

## 2021-09-14 VITALS — BODY MASS INDEX: 30.49 KG/M2 | SYSTOLIC BLOOD PRESSURE: 120 MMHG | DIASTOLIC BLOOD PRESSURE: 82 MMHG | WEIGHT: 211 LBS

## 2021-09-14 DIAGNOSIS — O16.3 ELEVATED BLOOD PRESSURE AFFECTING PREGNANCY IN THIRD TRIMESTER, ANTEPARTUM: ICD-10-CM

## 2021-09-14 DIAGNOSIS — Z3A.36 36 WEEKS GESTATION OF PREGNANCY: Primary | ICD-10-CM

## 2021-09-14 LAB
GLUCOSE UR STRIP-MCNC: NEGATIVE MG/DL
PROT UR STRIP-MCNC: ABNORMAL MG/DL

## 2021-09-14 PROCEDURE — 0502F SUBSEQUENT PRENATAL CARE: CPT | Performed by: NURSE PRACTITIONER

## 2021-09-14 NOTE — PROGRESS NOTES
OB FOLLOW UP  CC- Here for care of pregnancy        Desiree Cheung is a 30 y.o.  36w3d patient being seen today for her obstetrical follow up visit. Patient reports no complaints.  Pt brought in her 24 hour urine today.     Her prenatal care is complicated by (and status) :    Patient Active Problem List   Diagnosis   • Elevated blood pressure reading in office with white coat syndrome, without diagnosis of hypertension   • Prenatal care, subsequent pregnancy, second trimester   • 32 weeks gestation of pregnancy   • Elevated blood pressure affecting pregnancy in third trimester, antepartum   • 35 weeks gestation of pregnancy       Flu Status: Desires at future appt  Ultrasound Today: Yes.  Findings showed BJ 10.7, EFW 5 14 oz, ,  I have personally evaluated the U/S and agree with the findings. RUTH ANN Hopson    ROS -   Patient Reports : No Problems  Patient Denies: Loss of Fluid, Vaginal Spotting, Vision Changes and Headaches  Fetal Movement : normal  All other systems reviewed and are negative.       The additional following portions of the patient's history were reviewed and updated as appropriate: past social history, past surgical history and problem list.    I have reviewed and agree with the HPI, ROS, and historical information as entered above. RUTH ANN Hopson    /82   Wt 95.7 kg (211 lb)   LMP 2021 (Exact Date)   BMI 30.49 kg/m²       EXAM:     FHT: 138 BPM   Uterine Size: size equals dates  Pelvic Exam: No    Urine glucose/protein: See prenatal flowsheet       Assessment and Plan    Problem List Items Addressed This Visit        Gravid and     Elevated blood pressure affecting pregnancy in third trimester, antepartum      Other Visit Diagnoses     36 weeks gestation of pregnancy    -  Primary    Relevant Orders    POC Urinalysis Dipstick (Completed)          1. Pregnancy at 36w3d  2. Fetal status reassuring.   3. Activity and Exercise discussed.  U/S today BPP=  8/8, EFW= 29%, AC 12%  24 hour urine brought in today  Reviewed with , can schedule next appointment for next Friday 9/24/21    RUTH ANN Hopson  09/14/2021

## 2021-09-15 LAB
B-HEM STREP SPEC QL CULT: NEGATIVE
CREAT 24H UR-MRATE: 1341 MG/24 HR (ref 800–1800)
CREAT UR-MCNC: 47.9 MG/DL
PROT 24H UR-MRATE: 171 MG/24 HR (ref 30–150)
PROT UR-MCNC: 6.1 MG/DL

## 2021-09-24 ENCOUNTER — ROUTINE PRENATAL (OUTPATIENT)
Dept: OBSTETRICS AND GYNECOLOGY | Facility: CLINIC | Age: 31
End: 2021-09-24

## 2021-09-24 VITALS — SYSTOLIC BLOOD PRESSURE: 138 MMHG | DIASTOLIC BLOOD PRESSURE: 92 MMHG | BODY MASS INDEX: 30.96 KG/M2 | WEIGHT: 214.2 LBS

## 2021-09-24 DIAGNOSIS — R03.0 ELEVATED BLOOD PRESSURE READING IN OFFICE WITH WHITE COAT SYNDROME, WITHOUT DIAGNOSIS OF HYPERTENSION: ICD-10-CM

## 2021-09-24 DIAGNOSIS — Z3A.38 38 WEEKS GESTATION OF PREGNANCY: Primary | ICD-10-CM

## 2021-09-24 LAB
EXPIRATION DATE: 0
GLUCOSE UR STRIP-MCNC: NEGATIVE MG/DL
Lab: 0
PROT UR STRIP-MCNC: NEGATIVE MG/DL

## 2021-09-24 PROCEDURE — 59025 FETAL NON-STRESS TEST: CPT | Performed by: NURSE PRACTITIONER

## 2021-09-24 PROCEDURE — 0502F SUBSEQUENT PRENATAL CARE: CPT | Performed by: NURSE PRACTITIONER

## 2021-09-24 NOTE — PROGRESS NOTES
OB FOLLOW UP  CC- Here for care of pregnancy        Desiree Cheung is a 30 y.o.  37w6d patient being seen today for her obstetrical follow up visit. Patient reports no complaints. States BP at home this morning was 115/70.     Her prenatal care is complicated by (and status) :    Patient Active Problem List   Diagnosis   • Elevated blood pressure reading in office with white coat syndrome, without diagnosis of hypertension   • Prenatal care, subsequent pregnancy, second trimester   • 32 weeks gestation of pregnancy   • Elevated blood pressure affecting pregnancy in third trimester, antepartum   • 35 weeks gestation of pregnancy         Flu Status: Declines  GBS Status: Was already done and it was negative.   Her Delivery Plan is: Does not desire IOL  Ultrasound Today: No.    ROS -   Patient Reports : No Problems  Patient Denies: Loss of Fluid, Vaginal Spotting, Vision Changes, Headaches, Nausea , Vomiting , Contractions and Epigastric pain  Fetal Movement : normal  All other systems reviewed and are negative.       The additional following portions of the patient's history were reviewed and updated as appropriate: allergies, current medications, past family history, past medical history, past social history, past surgical history and problem list.    I have reviewed and agree with the HPI, ROS, and historical information as entered above. Trina Thompson, APRN        /92   Wt 97.2 kg (214 lb 3.2 oz)   LMP 2021 (Exact Date)   BMI 30.96 kg/m²     EXAM:     FHT: 150 BPM     Urine glucose/protein: See prenatal flowsheet       Assessment and Plan    Problem List Items Addressed This Visit        Cardiac and Vasculature    Elevated blood pressure reading in office with white coat syndrome, without diagnosis of hypertension      Other Visit Diagnoses     38 weeks gestation of pregnancy    -  Primary    Relevant Orders    POC Glucose, Urine, Qualitative, Dipstick (Completed)    POC Protein, Urine,  Qualitative, Dipstick (Completed)          1. Pregnancy at 37w6d  2. Fetal status reassuring. NST reactive. BP at home this morning 115/70.   3. PIH and labor precautions reviewed.   Return for NST next Friday with Shital.    Trina Thompson, APRN  09/24/2021

## 2021-10-01 ENCOUNTER — ROUTINE PRENATAL (OUTPATIENT)
Dept: OBSTETRICS AND GYNECOLOGY | Facility: CLINIC | Age: 31
End: 2021-10-01

## 2021-10-01 VITALS — DIASTOLIC BLOOD PRESSURE: 68 MMHG | SYSTOLIC BLOOD PRESSURE: 118 MMHG

## 2021-10-01 DIAGNOSIS — Z34.83 PRENATAL CARE, SUBSEQUENT PREGNANCY, THIRD TRIMESTER: Primary | ICD-10-CM

## 2021-10-01 DIAGNOSIS — R03.0 ELEVATED BLOOD PRESSURE READING IN OFFICE WITH WHITE COAT SYNDROME, WITHOUT DIAGNOSIS OF HYPERTENSION: ICD-10-CM

## 2021-10-01 LAB
GLUCOSE UR STRIP-MCNC: NEGATIVE MG/DL
PROT UR STRIP-MCNC: NEGATIVE MG/DL

## 2021-10-01 PROCEDURE — 0502F SUBSEQUENT PRENATAL CARE: CPT | Performed by: NURSE PRACTITIONER

## 2021-10-01 NOTE — PROGRESS NOTES
OB FOLLOW UP    Desiree Cheung is a 30 y.o.  38w6d patient being seen today for her obstetrical follow up visit. Patient reports no complaints. Pt feels well, good fm, no ctx, HA or vision changes. BP @ home tdy 112/74  NST- reactive    Her prenatal care is complicated by (and status) :    Patient Active Problem List   Diagnosis   • Elevated blood pressure reading in office with white coat syndrome, without diagnosis of hypertension   • Prenatal care, subsequent pregnancy, second trimester   • 32 weeks gestation of pregnancy   • Elevated blood pressure affecting pregnancy in third trimester, antepartum   • 35 weeks gestation of pregnancy       ROS -   Patient Reports : No Problems  Patient Denies: Loss of Fluid, Vaginal Spotting, Vision Changes, Headaches and Cramping/Contractions   Fetal Movement : normal      /68   LMP 2021 (Exact Date)     Ultrasound Today: no    EXAM:  Vitals: See prenatal flowsheet       Urine glucose/protein: See prenatal flowsheet         Assessment    Diagnoses and all orders for this visit:    1. Prenatal care, subsequent pregnancy, third trimester (Primary)  -     POC Urinalysis Dipstick, Automated  -     Fetal Nonstress Test    2. Elevated blood pressure reading in office with white coat syndrome, without diagnosis of hypertension        1. Pregnancy at 38w6d  2. Fetal status reassuring     Problem List Items Addressed This Visit        Cardiac and Vasculature    Elevated blood pressure reading in office with white coat syndrome, without diagnosis of hypertension      Other Visit Diagnoses     Prenatal care, subsequent pregnancy, third trimester    -  Primary    Relevant Orders    POC Urinalysis Dipstick, Automated (Completed)    Fetal Nonstress Test          Plan    1. Fetal movement/Labor Precautions/PIH precautions  2. BP well controlled today, her  is with her today. BP has been well controlled at home. White coat syndrome.   3. Follow up in 1 week with  NST. Pt not interested in induction.       Trina Thompson, APRN  10/01/2021

## 2021-10-07 ENCOUNTER — ROUTINE PRENATAL (OUTPATIENT)
Dept: OBSTETRICS AND GYNECOLOGY | Facility: CLINIC | Age: 31
End: 2021-10-07

## 2021-10-07 VITALS — SYSTOLIC BLOOD PRESSURE: 150 MMHG | WEIGHT: 214 LBS | BODY MASS INDEX: 30.93 KG/M2 | DIASTOLIC BLOOD PRESSURE: 100 MMHG

## 2021-10-07 DIAGNOSIS — Z3A.39 39 WEEKS GESTATION OF PREGNANCY: Primary | ICD-10-CM

## 2021-10-07 DIAGNOSIS — O13.3 GESTATIONAL HYPERTENSION, THIRD TRIMESTER: ICD-10-CM

## 2021-10-07 LAB
GLUCOSE UR STRIP-MCNC: NEGATIVE MG/DL
PROT UR STRIP-MCNC: NEGATIVE MG/DL

## 2021-10-07 PROCEDURE — 0502F SUBSEQUENT PRENATAL CARE: CPT | Performed by: NURSE PRACTITIONER

## 2021-10-07 PROCEDURE — 59025 FETAL NON-STRESS TEST: CPT | Performed by: NURSE PRACTITIONER

## 2021-10-07 NOTE — PROGRESS NOTES
OB FOLLOW UP  CC- Here for care of pregnancy        Desiree Cheung is a 30 y.o.  39w5d patient being seen today for her obstetrical follow up visit. Patient reports no complaints. Patient having NST monitoring for gestation hypertension. She is not on any hypertensive medications. Initial B/P in office is 150/100.  Denies headaches, visual changes, or swelling. (24 hour urine = 171, PEP labs platelets 131 on 9/10/21.    NST reactive today (Total time 37 minutes)  Start time: 10:45am  End time: 11:22  REPEAT B/P AFTER /82      Her prenatal care is complicated by (and status) :    Patient Active Problem List   Diagnosis   • Elevated blood pressure reading in office with white coat syndrome, without diagnosis of hypertension   • Prenatal care, subsequent pregnancy, second trimester   • 32 weeks gestation of pregnancy   • Elevated blood pressure affecting pregnancy in third trimester, antepartum   • 35 weeks gestation of pregnancy       Flu Status: Desires at future appt  Ultrasound Today: No.    ROS -   Patient Reports : No Problems  Patient Denies: Loss of Fluid, Vaginal Spotting and Contractions  Fetal Movement : normal  All other systems reviewed and are negative.       The additional following portions of the patient's history were reviewed and updated as appropriate: allergies and current medications.    I have reviewed and agree with the HPI, ROS, and historical information as entered above. Christina Varela, APRN    /100   Wt 97.1 kg (214 lb)   LMP 2021 (Exact Date)   BMI 30.93 kg/m²       EXAM:     FHT: 140 BPM   Uterine Size: size equals dates  Pelvic Exam: No    Urine glucose/protein: See prenatal flowsheet       Assessment and Plan    Problem List Items Addressed This Visit     None      Visit Diagnoses     39 weeks gestation of pregnancy    -  Primary    Relevant Orders    POC Urinalysis Dipstick (Completed)    Preeclampsia Panel          1. Pregnancy at 39w5d  2. Fetal  status reassuring.   3. NST reactive for gest HTN  4. Repeat PEP labs today  5. Repeat B/P after /82  6. Will check labs in am  7. Monitor daily fetal movements  8. RTC to see  on Monday 10/11/22 in [place] office with BPP.      Christina Varela, APRN  10/07/2021

## 2021-10-08 ENCOUNTER — ANESTHESIA EVENT (OUTPATIENT)
Dept: LABOR AND DELIVERY | Facility: HOSPITAL | Age: 31
End: 2021-10-08

## 2021-10-08 ENCOUNTER — ANESTHESIA (OUTPATIENT)
Dept: LABOR AND DELIVERY | Facility: HOSPITAL | Age: 31
End: 2021-10-08

## 2021-10-08 ENCOUNTER — HOSPITAL ENCOUNTER (INPATIENT)
Facility: HOSPITAL | Age: 31
LOS: 2 days | Discharge: HOME | End: 2021-10-10
Attending: OBSTETRICS & GYNECOLOGY | Admitting: OBSTETRICS & GYNECOLOGY

## 2021-10-08 PROBLEM — Z37.9 NORMAL LABOR: Status: ACTIVE | Noted: 2021-10-08

## 2021-10-08 LAB
ABO GROUP BLD: NORMAL
ABO GROUP BLD: NORMAL
ALP SERPL-CCNC: 143 U/L (ref 39–117)
ALT SERPL W P-5'-P-CCNC: 9 U/L (ref 1–33)
ALT SERPL-CCNC: 10 IU/L (ref 0–32)
AST SERPL-CCNC: 13 IU/L (ref 0–40)
AST SERPL-CCNC: 19 U/L (ref 1–32)
BASOPHILS # BLD AUTO: 0 X10E3/UL (ref 0–0.2)
BASOPHILS NFR BLD AUTO: 0 %
BILIRUB SERPL-MCNC: 0.4 MG/DL (ref 0–1.2)
BLD GP AB SCN SERPL QL: NEGATIVE
BUN SERPL-MCNC: 7 MG/DL (ref 6–20)
CREAT SERPL-MCNC: 0.62 MG/DL (ref 0.57–1)
CREAT SERPL-MCNC: 0.62 MG/DL (ref 0.57–1)
CREAT UR-MCNC: ABNORMAL MG/DL
DEPRECATED RDW RBC AUTO: 41.4 FL (ref 37–54)
EOSINOPHIL # BLD AUTO: 0 X10E3/UL (ref 0–0.4)
EOSINOPHIL NFR BLD AUTO: 0 %
ERYTHROCYTE [DISTWIDTH] IN BLOOD BY AUTOMATED COUNT: 12.9 % (ref 12.3–15.4)
ERYTHROCYTE [DISTWIDTH] IN BLOOD BY AUTOMATED COUNT: 13 % (ref 11.7–15.4)
HCT VFR BLD AUTO: 42.2 % (ref 34–46.6)
HCT VFR BLD AUTO: 44.5 % (ref 34–46.6)
HGB BLD-MCNC: 14.5 G/DL (ref 12–15.9)
HGB BLD-MCNC: 14.7 G/DL (ref 11.1–15.9)
IMM GRANULOCYTES # BLD AUTO: 0.1 X10E3/UL (ref 0–0.1)
IMM GRANULOCYTES NFR BLD AUTO: 1 %
LDH SERPL-CCNC: 149 IU/L (ref 119–226)
LDH SERPL-CCNC: 306 U/L (ref 135–214)
LYMPHOCYTES # BLD AUTO: 2 X10E3/UL (ref 0.7–3.1)
LYMPHOCYTES NFR BLD AUTO: 19 %
MCH RBC QN AUTO: 29.4 PG (ref 26.6–33)
MCH RBC QN AUTO: 30.1 PG (ref 26.6–33)
MCHC RBC AUTO-ENTMCNC: 33 G/DL (ref 31.5–35.7)
MCHC RBC AUTO-ENTMCNC: 34.4 G/DL (ref 31.5–35.7)
MCV RBC AUTO: 87.7 FL (ref 79–97)
MCV RBC AUTO: 89 FL (ref 79–97)
MICROALBUMIN UR-MCNC: ABNORMAL
MONOCYTES # BLD AUTO: 0.6 X10E3/UL (ref 0.1–0.9)
MONOCYTES NFR BLD AUTO: 6 %
NEUTROPHILS # BLD AUTO: 7.5 X10E3/UL (ref 1.4–7)
NEUTROPHILS NFR BLD AUTO: 74 %
PLATELET # BLD AUTO: 137 X10E3/UL (ref 150–450)
PLATELET # BLD AUTO: 146 10*3/MM3 (ref 140–450)
PMV BLD AUTO: 13 FL (ref 6–12)
RBC # BLD AUTO: 4.81 10*6/MM3 (ref 3.77–5.28)
RBC # BLD AUTO: 5 X10E6/UL (ref 3.77–5.28)
RH BLD: POSITIVE
RH BLD: POSITIVE
SARS-COV-2 RDRP RESP QL NAA+PROBE: NORMAL
T&S EXPIRATION DATE: NORMAL
UNABLE TO VOID: NORMAL
URATE SERPL-MCNC: 3.4 MG/DL (ref 2.4–5.7)
URATE SERPL-MCNC: 3.6 MG/DL (ref 2.6–6.2)
WBC # BLD AUTO: 10.1 X10E3/UL (ref 3.4–10.8)
WBC # BLD AUTO: 15.25 10*3/MM3 (ref 3.4–10.8)

## 2021-10-08 PROCEDURE — 82565 ASSAY OF CREATININE: CPT | Performed by: OBSTETRICS & GYNECOLOGY

## 2021-10-08 PROCEDURE — 25010000002 BUTORPHANOL PER 1 MG: Performed by: OBSTETRICS & GYNECOLOGY

## 2021-10-08 PROCEDURE — 59025 FETAL NON-STRESS TEST: CPT

## 2021-10-08 PROCEDURE — 84075 ASSAY ALKALINE PHOSPHATASE: CPT | Performed by: OBSTETRICS & GYNECOLOGY

## 2021-10-08 PROCEDURE — 25010000002 FENTANYL CITRATE (PF) 50 MCG/ML SOLUTION: Performed by: ANESTHESIOLOGY

## 2021-10-08 PROCEDURE — 0KQM0ZZ REPAIR PERINEUM MUSCLE, OPEN APPROACH: ICD-10-PCS | Performed by: OBSTETRICS & GYNECOLOGY

## 2021-10-08 PROCEDURE — 25010000002 ONDANSETRON PER 1 MG: Performed by: OBSTETRICS & GYNECOLOGY

## 2021-10-08 PROCEDURE — 83615 LACTATE (LD) (LDH) ENZYME: CPT | Performed by: OBSTETRICS & GYNECOLOGY

## 2021-10-08 PROCEDURE — 82247 BILIRUBIN TOTAL: CPT | Performed by: OBSTETRICS & GYNECOLOGY

## 2021-10-08 PROCEDURE — 84550 ASSAY OF BLOOD/URIC ACID: CPT | Performed by: OBSTETRICS & GYNECOLOGY

## 2021-10-08 PROCEDURE — 86850 RBC ANTIBODY SCREEN: CPT | Performed by: OBSTETRICS & GYNECOLOGY

## 2021-10-08 PROCEDURE — 86900 BLOOD TYPING SEROLOGIC ABO: CPT

## 2021-10-08 PROCEDURE — 85027 COMPLETE CBC AUTOMATED: CPT | Performed by: OBSTETRICS & GYNECOLOGY

## 2021-10-08 PROCEDURE — 10907ZC DRAINAGE OF AMNIOTIC FLUID, THERAPEUTIC FROM PRODUCTS OF CONCEPTION, VIA NATURAL OR ARTIFICIAL OPENING: ICD-10-PCS | Performed by: OBSTETRICS & GYNECOLOGY

## 2021-10-08 PROCEDURE — 86901 BLOOD TYPING SEROLOGIC RH(D): CPT | Performed by: OBSTETRICS & GYNECOLOGY

## 2021-10-08 PROCEDURE — 87635 SARS-COV-2 COVID-19 AMP PRB: CPT | Performed by: OBSTETRICS & GYNECOLOGY

## 2021-10-08 PROCEDURE — 59400 OBSTETRICAL CARE: CPT | Performed by: OBSTETRICS & GYNECOLOGY

## 2021-10-08 PROCEDURE — 86900 BLOOD TYPING SEROLOGIC ABO: CPT | Performed by: OBSTETRICS & GYNECOLOGY

## 2021-10-08 PROCEDURE — C1755 CATHETER, INTRASPINAL: HCPCS | Performed by: ANESTHESIOLOGY

## 2021-10-08 PROCEDURE — 84450 TRANSFERASE (AST) (SGOT): CPT | Performed by: OBSTETRICS & GYNECOLOGY

## 2021-10-08 PROCEDURE — 86901 BLOOD TYPING SEROLOGIC RH(D): CPT

## 2021-10-08 PROCEDURE — 84460 ALANINE AMINO (ALT) (SGPT): CPT | Performed by: OBSTETRICS & GYNECOLOGY

## 2021-10-08 PROCEDURE — 25010000002 ROPIVACAINE PER 1 MG: Performed by: ANESTHESIOLOGY

## 2021-10-08 RX ORDER — LANOLIN
1 CREAM (ML) TOPICAL
Status: DISCONTINUED | OUTPATIENT
Start: 2021-10-08 | End: 2021-10-10 | Stop reason: HOSPADM

## 2021-10-08 RX ORDER — FENTANYL CITRATE 50 UG/ML
INJECTION, SOLUTION INTRAMUSCULAR; INTRAVENOUS AS NEEDED
Status: DISCONTINUED | OUTPATIENT
Start: 2021-10-08 | End: 2021-10-08 | Stop reason: SURG

## 2021-10-08 RX ORDER — DOCUSATE SODIUM 100 MG/1
100 CAPSULE, LIQUID FILLED ORAL 2 TIMES DAILY
Status: DISCONTINUED | OUTPATIENT
Start: 2021-10-08 | End: 2021-10-10 | Stop reason: HOSPADM

## 2021-10-08 RX ORDER — EPHEDRINE SULFATE 5 MG/ML
10 INJECTION INTRAVENOUS
Status: DISCONTINUED | OUTPATIENT
Start: 2021-10-08 | End: 2021-10-08 | Stop reason: HOSPADM

## 2021-10-08 RX ORDER — HYDROXYZINE HYDROCHLORIDE 25 MG/1
50 TABLET, FILM COATED ORAL NIGHTLY PRN
Status: DISCONTINUED | OUTPATIENT
Start: 2021-10-08 | End: 2021-10-10 | Stop reason: HOSPADM

## 2021-10-08 RX ORDER — SODIUM CHLORIDE 0.9 % (FLUSH) 0.9 %
3 SYRINGE (ML) INJECTION EVERY 12 HOURS SCHEDULED
Status: DISCONTINUED | OUTPATIENT
Start: 2021-10-08 | End: 2021-10-08 | Stop reason: HOSPADM

## 2021-10-08 RX ORDER — ACETAMINOPHEN 325 MG/1
650 TABLET ORAL EVERY 4 HOURS PRN
Status: DISCONTINUED | OUTPATIENT
Start: 2021-10-08 | End: 2021-10-08 | Stop reason: HOSPADM

## 2021-10-08 RX ORDER — ROPIVACAINE HYDROCHLORIDE 2 MG/ML
15 INJECTION, SOLUTION EPIDURAL; INFILTRATION; PERINEURAL CONTINUOUS
Status: DISCONTINUED | OUTPATIENT
Start: 2021-10-08 | End: 2021-10-10 | Stop reason: HOSPADM

## 2021-10-08 RX ORDER — DIPHENHYDRAMINE HYDROCHLORIDE 50 MG/ML
12.5 INJECTION INTRAMUSCULAR; INTRAVENOUS EVERY 8 HOURS PRN
Status: DISCONTINUED | OUTPATIENT
Start: 2021-10-08 | End: 2021-10-08 | Stop reason: HOSPADM

## 2021-10-08 RX ORDER — ONDANSETRON 4 MG/1
4 TABLET, FILM COATED ORAL EVERY 6 HOURS PRN
Status: DISCONTINUED | OUTPATIENT
Start: 2021-10-08 | End: 2021-10-08 | Stop reason: HOSPADM

## 2021-10-08 RX ORDER — ONDANSETRON 2 MG/ML
4 INJECTION INTRAMUSCULAR; INTRAVENOUS ONCE AS NEEDED
Status: DISCONTINUED | OUTPATIENT
Start: 2021-10-08 | End: 2021-10-08 | Stop reason: HOSPADM

## 2021-10-08 RX ORDER — HYDROCORTISONE 25 MG/G
1 CREAM TOPICAL AS NEEDED
Status: DISCONTINUED | OUTPATIENT
Start: 2021-10-08 | End: 2021-10-10 | Stop reason: HOSPADM

## 2021-10-08 RX ORDER — METHYLERGONOVINE MALEATE 0.2 MG/ML
200 INJECTION INTRAVENOUS ONCE AS NEEDED
Status: DISCONTINUED | OUTPATIENT
Start: 2021-10-08 | End: 2021-10-08 | Stop reason: HOSPADM

## 2021-10-08 RX ORDER — ONDANSETRON 2 MG/ML
4 INJECTION INTRAMUSCULAR; INTRAVENOUS EVERY 6 HOURS PRN
Status: DISCONTINUED | OUTPATIENT
Start: 2021-10-08 | End: 2021-10-08 | Stop reason: HOSPADM

## 2021-10-08 RX ORDER — LIDOCAINE HYDROCHLORIDE AND EPINEPHRINE 15; 5 MG/ML; UG/ML
INJECTION, SOLUTION EPIDURAL AS NEEDED
Status: DISCONTINUED | OUTPATIENT
Start: 2021-10-08 | End: 2021-10-08 | Stop reason: SURG

## 2021-10-08 RX ORDER — MAGNESIUM CARB/ALUMINUM HYDROX 105-160MG
30 TABLET,CHEWABLE ORAL ONCE
Status: DISCONTINUED | OUTPATIENT
Start: 2021-10-08 | End: 2021-10-08 | Stop reason: HOSPADM

## 2021-10-08 RX ORDER — CARBOPROST TROMETHAMINE 250 UG/ML
250 INJECTION, SOLUTION INTRAMUSCULAR AS NEEDED
Status: DISCONTINUED | OUTPATIENT
Start: 2021-10-08 | End: 2021-10-08 | Stop reason: HOSPADM

## 2021-10-08 RX ORDER — EPHEDRINE SULFATE 5 MG/ML
10 INJECTION INTRAVENOUS
Status: DISCONTINUED | OUTPATIENT
Start: 2021-10-08 | End: 2021-10-08 | Stop reason: SDUPTHER

## 2021-10-08 RX ORDER — BISACODYL 10 MG
10 SUPPOSITORY, RECTAL RECTAL DAILY PRN
Status: DISCONTINUED | OUTPATIENT
Start: 2021-10-09 | End: 2021-10-10 | Stop reason: HOSPADM

## 2021-10-08 RX ORDER — EPHEDRINE SULFATE 5 MG/ML
INJECTION INTRAVENOUS
Status: DISCONTINUED
Start: 2021-10-08 | End: 2021-10-10 | Stop reason: HOSPADM

## 2021-10-08 RX ORDER — PROMETHAZINE HYDROCHLORIDE 25 MG/1
25 TABLET ORAL EVERY 6 HOURS PRN
Status: DISCONTINUED | OUTPATIENT
Start: 2021-10-08 | End: 2021-10-10 | Stop reason: HOSPADM

## 2021-10-08 RX ORDER — FAMOTIDINE 10 MG/ML
20 INJECTION, SOLUTION INTRAVENOUS ONCE AS NEEDED
Status: DISCONTINUED | OUTPATIENT
Start: 2021-10-08 | End: 2021-10-08 | Stop reason: HOSPADM

## 2021-10-08 RX ORDER — METOCLOPRAMIDE HYDROCHLORIDE 5 MG/ML
10 INJECTION INTRAMUSCULAR; INTRAVENOUS ONCE AS NEEDED
Status: DISCONTINUED | OUTPATIENT
Start: 2021-10-08 | End: 2021-10-08 | Stop reason: HOSPADM

## 2021-10-08 RX ORDER — HYDROCODONE BITARTRATE AND ACETAMINOPHEN 5; 325 MG/1; MG/1
1 TABLET ORAL EVERY 4 HOURS PRN
Status: DISCONTINUED | OUTPATIENT
Start: 2021-10-08 | End: 2021-10-10 | Stop reason: HOSPADM

## 2021-10-08 RX ORDER — SODIUM CHLORIDE 0.9 % (FLUSH) 0.9 %
10 SYRINGE (ML) INJECTION AS NEEDED
Status: DISCONTINUED | OUTPATIENT
Start: 2021-10-08 | End: 2021-10-08 | Stop reason: HOSPADM

## 2021-10-08 RX ORDER — ROPIVACAINE HYDROCHLORIDE 5 MG/ML
INJECTION, SOLUTION EPIDURAL; INFILTRATION; PERINEURAL AS NEEDED
Status: DISCONTINUED | OUTPATIENT
Start: 2021-10-08 | End: 2021-10-08 | Stop reason: SURG

## 2021-10-08 RX ORDER — MISOPROSTOL 200 UG/1
800 TABLET ORAL AS NEEDED
Status: DISCONTINUED | OUTPATIENT
Start: 2021-10-08 | End: 2021-10-08 | Stop reason: HOSPADM

## 2021-10-08 RX ORDER — OXYTOCIN-SODIUM CHLORIDE 0.9% IV SOLN 30 UNIT/500ML 30-0.9/5 UT/ML-%
2-20 SOLUTION INTRAVENOUS
Status: DISCONTINUED | OUTPATIENT
Start: 2021-10-08 | End: 2021-10-10 | Stop reason: HOSPADM

## 2021-10-08 RX ORDER — OXYTOCIN-SODIUM CHLORIDE 0.9% IV SOLN 30 UNIT/500ML 30-0.9/5 UT/ML-%
85 SOLUTION INTRAVENOUS ONCE
Status: COMPLETED | OUTPATIENT
Start: 2021-10-08 | End: 2021-10-08

## 2021-10-08 RX ORDER — SODIUM CHLORIDE, SODIUM LACTATE, POTASSIUM CHLORIDE, CALCIUM CHLORIDE 600; 310; 30; 20 MG/100ML; MG/100ML; MG/100ML; MG/100ML
125 INJECTION, SOLUTION INTRAVENOUS CONTINUOUS
Status: DISCONTINUED | OUTPATIENT
Start: 2021-10-08 | End: 2021-10-10 | Stop reason: HOSPADM

## 2021-10-08 RX ORDER — OXYTOCIN-SODIUM CHLORIDE 0.9% IV SOLN 30 UNIT/500ML 30-0.9/5 UT/ML-%
650 SOLUTION INTRAVENOUS ONCE
Status: COMPLETED | OUTPATIENT
Start: 2021-10-08 | End: 2021-10-08

## 2021-10-08 RX ORDER — TRISODIUM CITRATE DIHYDRATE AND CITRIC ACID MONOHYDRATE 500; 334 MG/5ML; MG/5ML
30 SOLUTION ORAL ONCE
Status: DISCONTINUED | OUTPATIENT
Start: 2021-10-08 | End: 2021-10-08 | Stop reason: HOSPADM

## 2021-10-08 RX ORDER — IBUPROFEN 600 MG/1
600 TABLET ORAL EVERY 6 HOURS PRN
Status: DISCONTINUED | OUTPATIENT
Start: 2021-10-08 | End: 2021-10-10 | Stop reason: HOSPADM

## 2021-10-08 RX ORDER — ERYTHROMYCIN 5 MG/G
OINTMENT OPHTHALMIC
Status: DISCONTINUED
Start: 2021-10-08 | End: 2021-10-10 | Stop reason: HOSPADM

## 2021-10-08 RX ORDER — BUTORPHANOL TARTRATE 1 MG/ML
1 INJECTION, SOLUTION INTRAMUSCULAR; INTRAVENOUS
Status: DISCONTINUED | OUTPATIENT
Start: 2021-10-08 | End: 2021-10-08 | Stop reason: HOSPADM

## 2021-10-08 RX ORDER — LIDOCAINE HYDROCHLORIDE 10 MG/ML
5 INJECTION, SOLUTION EPIDURAL; INFILTRATION; INTRACAUDAL; PERINEURAL AS NEEDED
Status: DISCONTINUED | OUTPATIENT
Start: 2021-10-08 | End: 2021-10-08 | Stop reason: HOSPADM

## 2021-10-08 RX ADMIN — BUTORPHANOL TARTRATE 2 MG: 2 INJECTION, SOLUTION INTRAMUSCULAR; INTRAVENOUS at 07:31

## 2021-10-08 RX ADMIN — SODIUM CHLORIDE, POTASSIUM CHLORIDE, SODIUM LACTATE AND CALCIUM CHLORIDE 125 ML/HR: 600; 310; 30; 20 INJECTION, SOLUTION INTRAVENOUS at 10:33

## 2021-10-08 RX ADMIN — FENTANYL CITRATE 100 MCG: 50 INJECTION, SOLUTION INTRAMUSCULAR; INTRAVENOUS at 10:07

## 2021-10-08 RX ADMIN — WITCH HAZEL 1 PAD: 500 SOLUTION RECTAL; TOPICAL at 18:19

## 2021-10-08 RX ADMIN — ROPIVACAINE HYDROCHLORIDE 15 ML/HR: 2 INJECTION, SOLUTION EPIDURAL; INFILTRATION at 10:07

## 2021-10-08 RX ADMIN — OXYTOCIN 2 MILLI-UNITS/MIN: 10 INJECTION INTRAVENOUS at 09:14

## 2021-10-08 RX ADMIN — ROPIVACAINE HYDROCHLORIDE 6 ML: 5 INJECTION, SOLUTION EPIDURAL; INFILTRATION; PERINEURAL at 10:07

## 2021-10-08 RX ADMIN — SODIUM CHLORIDE, POTASSIUM CHLORIDE, SODIUM LACTATE AND CALCIUM CHLORIDE 125 ML/HR: 600; 310; 30; 20 INJECTION, SOLUTION INTRAVENOUS at 04:55

## 2021-10-08 RX ADMIN — OXYTOCIN 85 ML/HR: 10 INJECTION INTRAVENOUS at 16:04

## 2021-10-08 RX ADMIN — BUTORPHANOL TARTRATE 2 MG: 2 INJECTION, SOLUTION INTRAMUSCULAR; INTRAVENOUS at 05:00

## 2021-10-08 RX ADMIN — OXYTOCIN 650 ML/HR: 10 INJECTION INTRAVENOUS at 15:40

## 2021-10-08 RX ADMIN — LIDOCAINE HYDROCHLORIDE AND EPINEPHRINE 3 ML: 15; 5 INJECTION, SOLUTION EPIDURAL at 10:07

## 2021-10-08 RX ADMIN — Medication: at 18:19

## 2021-10-08 RX ADMIN — DOCUSATE SODIUM 100 MG: 100 CAPSULE, LIQUID FILLED ORAL at 20:24

## 2021-10-08 RX ADMIN — SODIUM CHLORIDE, POTASSIUM CHLORIDE, SODIUM LACTATE AND CALCIUM CHLORIDE 999 ML/HR: 600; 310; 30; 20 INJECTION, SOLUTION INTRAVENOUS at 09:39

## 2021-10-08 RX ADMIN — ONDANSETRON 4 MG: 2 INJECTION INTRAMUSCULAR; INTRAVENOUS at 07:12

## 2021-10-08 NOTE — PLAN OF CARE
Problem: Breastfeeding  Goal: Effective Breastfeeding  Outcome: Ongoing, Progressing  Intervention: Promote Breast Care and Comfort  Flowsheets (Taken 10/8/2021 1800)  Breast Care: Breastfeeding: frequency of feeding adjusted  Intervention: Promote Effective Breastfeeding  Flowsheets (Taken 10/8/2021 1800)  Breastfeeding Assistance:   feeding cue recognition promoted   feeding on demand promoted   support offered  Parent/Child Attachment Promotion:   cue recognition promoted   rooming-in promoted   skin-to-skin contact encouraged   Goal Outcome Evaluation:

## 2021-10-08 NOTE — PLAN OF CARE
Problem: Adult Inpatient Plan of Care  Goal: Plan of Care Review  Outcome: Ongoing, Progressing  Flowsheets (Taken 10/8/2021 1803)  Progress: improving  Plan of Care Reviewed With:   patient   spouse  Goal: Patient-Specific Goal (Individualized)  Outcome: Ongoing, Progressing  Goal: Absence of Hospital-Acquired Illness or Injury  Outcome: Ongoing, Progressing  Intervention: Identify and Manage Fall Risk  Description: Perform standard risk assessment with a validated tool or comprehensive approach appropriate to the patient on admission; reassess fall risk frequently, with change in status or transfer to another level of care.  Communicate fall injury risk to interprofessional healthcare team.  Determine need for increased observation, equipment and environmental modification, such as low bed and signage, as well as supportive, nonskid footwear.  Adjust safety measures to individual developmental age, stage and identified risk factors.  Reinforce the importance of safety and physical activity with patient and family.  Perform regular intentional rounding to assess need for position change, pain assessment, personal needs, including assistance with toileting.  Recent Flowsheet Documentation  Taken 10/8/2021 1525 by Ema Chase, RN  Safety Promotion/Fall Prevention:   safety round/check completed   assistive device/personal items within reach   clutter free environment maintained   fall prevention program maintained   nonskid shoes/slippers when out of bed  Taken 10/8/2021 1429 by Ema Chase, RN  Safety Promotion/Fall Prevention:   safety round/check completed   assistive device/personal items within reach   clutter free environment maintained   fall prevention program maintained   nonskid shoes/slippers when out of bed  Taken 10/8/2021 1331 by Ema Chase, RN  Safety Promotion/Fall Prevention:   safety round/check completed   clutter free environment maintained   assistive device/personal items within  [facility]   fall prevention program maintained   nonskid shoes/slippers when out of bed  Taken 10/8/2021 1235 by Ema Chase RN  Safety Promotion/Fall Prevention:   safety round/check completed   clutter free environment maintained   assistive device/personal items within reach   fall prevention program maintained   nonskid shoes/slippers when out of bed  Taken 10/8/2021 1145 by Ema Chase RN  Safety Promotion/Fall Prevention:   safety round/check completed   assistive device/personal items within reach   clutter free environment maintained   fall prevention program maintained   nonskid shoes/slippers when out of bed  Taken 10/8/2021 1032 by Ema Chase RN  Safety Promotion/Fall Prevention:   nonskid shoes/slippers when out of bed   safety round/check completed   assistive device/personal items within reach   clutter free environment maintained   fall prevention program maintained  Taken 10/8/2021 0928 by Ema Chase RN  Safety Promotion/Fall Prevention:   safety round/check completed   assistive device/personal items within reach   clutter free environment maintained   fall prevention program maintained   nonskid shoes/slippers when out of bed  Taken 10/8/2021 0815 by Ema Chase RN  Safety Promotion/Fall Prevention:   safety round/check completed   assistive device/personal items within reach   clutter free environment maintained   fall prevention program maintained   nonskid shoes/slippers when out of bed  Taken 10/8/2021 0731 by Ema Chase RN  Safety Promotion/Fall Prevention:   safety round/check completed   assistive device/personal items within reach   clutter free environment maintained   fall prevention program maintained   nonskid shoes/slippers when out of bed  Intervention: Prevent Skin Injury  Description: Assess skin risk on admission and at regular intervals throughout hospital stay.  Keep all areas of skin (especially folds) clean and dry.  Maintain adequate skin  hydration.  Relieve and redistribute pressure and protect bony prominences; implement measures based on patient-specific risk factors.  Match turning and repositioning schedule to clinical condition.  Encourage weight shift frequently; assist with reposition if unable to complete independently.  Float heels off bed. Avoid pressure on the Achilles tendon.  Keep skin free from extended contact with medical devices.  Use aids (e.g., slide boards, mechanical lift) during transfer.  Recent Flowsheet Documentation  Taken 10/8/2021 0731 by Ema Chase RN  Body Position: tilted, left  Skin Protection:   adhesive use limited   transparent dressing maintained  Intervention: Prevent Infection  Description: Maintain skin and mucous membrane integrity; promote hand, oral and pulmonary hygiene.  Optimize fluid balance, nutrition, sleep and glycemic control to maximize infection resistance.  Identify potential sources of infection early to prevent or mitigate progression of infection (e.g., wound, lines, devices).  Evaluate ongoing need for invasive devices; remove promptly when no longer indicated.  Recent Flowsheet Documentation  Taken 10/8/2021 1525 by Ema Chase RN  Infection Prevention: personal protective equipment utilized  Taken 10/8/2021 1429 by Ema Chase RN  Infection Prevention: personal protective equipment utilized  Taken 10/8/2021 1331 by Ema Chase RN  Infection Prevention: personal protective equipment utilized  Taken 10/8/2021 1235 by Ema Chase RN  Infection Prevention: personal protective equipment utilized  Taken 10/8/2021 1145 by Ema Chase RN  Infection Prevention: personal protective equipment utilized  Taken 10/8/2021 1032 by Ema Chase RN  Infection Prevention: personal protective equipment utilized  Taken 10/8/2021 0928 by Ema Chase RN  Infection Prevention: personal protective equipment utilized  Taken 10/8/2021 0815 by Ema Chase RN  Infection Prevention:  personal protective equipment utilized  Taken 10/8/2021 0731 by Ema Chase RN  Infection Prevention: personal protective equipment utilized  Goal: Optimal Comfort and Wellbeing  Outcome: Ongoing, Progressing  Intervention: Provide Person-Centered Care  Description: Use a family-focused approach to care.  Develop trust and rapport by proactively providing information, encouraging questions, addressing concerns and offering reassurance.  Acknowledge emotional response to hospitalization.  Recognize and utilize personal coping strategies.  Honor spiritual and cultural preferences.  Recent Flowsheet Documentation  Taken 10/8/2021 0731 by Ema Chase RN  Trust Relationship/Rapport:   care explained   choices provided   emotional support provided   questions answered   empathic listening provided   questions encouraged   reassurance provided   thoughts/feelings acknowledged  Goal: Readiness for Transition of Care  Outcome: Ongoing, Progressing   Goal Outcome Evaluation:  Plan of Care Reviewed With: patient, spouse        Progress: improving

## 2021-10-08 NOTE — ANESTHESIA PROCEDURE NOTES
Labor Epidural      Patient reassessed immediately prior to procedure    Patient location during procedure: OB  Start Time: 10/8/2021 9:57 AM  Stop Time: 10/8/2021 10:23 AM  Performed By  Anesthesiologist: Siddhartha Stallings MD  Preanesthetic Checklist  Completed: patient identified, IV checked, site marked, risks and benefits discussed, surgical consent, monitors and equipment checked, pre-op evaluation and timeout performed  Prep:  Pt Position:sitting  Sterile Tech:cap, gloves, mask and sterile barrier  Prep:DuraPrep  Monitoring:blood pressure monitoring  Epidural Block Procedure:  Approach:midline  Guidance:landmark technique  Location:L3-L4  Needle Type:Tuohy  Needle Gauge:17 G  Loss of Resistance Medium: air  Loss of Resistance: 9cm  Cath Depth at skin:15 cm  Paresthesia: none  Aspiration:negative  Test Dose:negative  Med administered at 10/8/2021 10:07 AM  Number of Attempts: 2  Post Assessment:  Dressing:occlusive dressing applied and secured with tape  Pt Tolerance:patient tolerated the procedure well with no apparent complications  Complications:no

## 2021-10-08 NOTE — H&P
[facility]  Obstetric History and Physical    Chief Complaint   Patient presents with   • Contractions       Subjective     Patient is a 30 y.o. female  currently at 39w6d, who presents with labor at term .    Her prenatal care is benign.  Her previous obstetric/gynecological history is noted for is non-contributory.    The following portions of the patients history were reviewed and updated as appropriate: current medications .       Prenatal Information:  Prenatal Results     POC Urine Glucose/Protein     Test Value Reference Range Date Time    Urine Glucose  Negative mg/dL Negative, 1000 mg/dL (3+) 10/07/21 1001    Urine Protein  Negative mg/dL Negative 10/07/21 1001          Initial Prenatal Labs     Test Value Reference Range Date Time    Hemoglobin  13.4 g/dL 11.1 - 15.9 21 1006    Hematocrit  40.4 % 34.0 - 46.6 21 1006    Platelets  146 10*3/mm3 140 - 450 10/08/21 0457       137 x10E3/uL 150 - 450 10/07/21 1054       131 x10E3/uL 150 - 450 09/10/21 0950       138 10*3/mm3 140 - 450 21 0950       154 x10E3/uL 150 - 450 21 1006    Rubella IgG  3.65 index Immune >0.99 21 1006    Hepatitis B SAg  Negative  Negative 21 1006    Hepatitis C Ab  <0.1 s/co ratio 0.0 - 0.9 21 1006    RPR  Non Reactive  Non Reactive 21 1006    ABO  A   10/08/21 0541    Rh  Positive   10/08/21 0541    Antibody Screen  Negative  Negative 21 1006    HIV  Non Reactive  Non Reactive 21 1006    Urine Culture  Final report   21 1006    Gonorrhea  Negative  Negative 21 1006    Chlamydia  Negative  Negative 21 1006    TSH              2nd and 3rd Trimester     Test Value Reference Range Date Time    Hemoglobin (repeated)  14.5 g/dL 12.0 - 15.9 10/08/21 0457       14.7 g/dL 11.1 - 15.9 10/07/21 1054       14.3 g/dL 11.1 - 15.9 09/10/21 0950       13.3 g/dL 12.0 - 15.9 21 0950    Hematocrit (repeated)  42.2 % 34.0 - 46.6 10/08/21 0457       44.5 % 34.0 -  46.6 10/07/21 1054       43.6 % 34.0 - 46.6 09/10/21 0950       39.1 % 34.0 - 46.6 07/30/21 0950    GCT  87 mg/dL 65 - 139 07/30/21 0950    Antibody Screen (repeated)  Negative   10/08/21 0457       Negative  Negative 07/30/21 0950    GTT Fasting        GTT 1 Hr        GTT 2 Hr        GTT 3 Hr        Group B Strep  Negative  Negative 09/10/21 0938          Drug Screening     Test Value Reference Range Date Time    Amphetamine Screen  Negative ng/mL Gcokxh=0543 03/12/21 1006    Barbiturate Screen  Negative ng/mL Kwqxsc=796 03/12/21 1006    Benzodiazepine Screen  Negative ng/mL Cpaftz=720 03/12/21 1006    Methadone Screen  Negative ng/mL Ikgieb=600 03/12/21 1006    Phencyclidine Screen  Negative ng/mL Cutoff=25 03/12/21 1006    Opiates Screen  Negative ng/mL Hshadb=883 03/12/21 1006    THC Screen  Negative ng/mL Cutoff=20 03/12/21 1006    Cocaine Screen  Negative ng/mL Igbruz=832 03/12/21 1006    Propoxyphene Screen  Negative ng/mL Tckrpf=951 03/12/21 1006    Buprenorphine Screen        Methamphetamine Screen        Oxycodone Screen        Tricyclic Antidepressants Screen              Other (Risk screening)     Test Value Reference Range Date Time    Varicella IgG        Parvovirus IgG        CMV IgG        Cystic Fibrosis        Hemoglobin electrophoresis        NIPT        MSAFP-4  *Screen Negative*   04/23/21 1039    AFP (for NTD only)              Legend    ^: Historical                      External Prenatal Results     Pregnancy Outside Results - Transcribed From Office Records - See Scanned Records For Details     Test Value Date Time    ABO  A  10/08/21 0541    Rh  Positive  10/08/21 0541    Antibody Screen  Negative  10/08/21 0457       Negative  07/30/21 0950       Negative  03/12/21 1006    Varicella IgG       Rubella  3.65 index 03/12/21 1006    Hgb  14.5 g/dL 10/08/21 0457       14.7 g/dL 10/07/21 1054       14.3 g/dL 09/10/21 0950       13.3 g/dL 07/30/21 0950       13.4 g/dL 03/12/21 1006    Hct  42.2 %  10/08/21 0457       44.5 % 10/07/21 1054       43.6 % 09/10/21 0950       39.1 % 21 0950       40.4 % 21 1006    Glucose Fasting GTT       Glucose Tolerance Test 1 hour       Glucose Tolerance Test 3 hour       Gonorrhea (discrete)  Negative  21 1006    Chlamydia (discrete)  Negative  21 1006    RPR  Non Reactive  21 1006    VDRL       Syphilis Antibody       HBsAg  Negative  21 1006    Herpes Simplex Virus PCR       Herpes Simplex VIrus Culture       HIV  Non Reactive  21 1006    Hep C RNA Quant PCR       Hep C Antibody  <0.1 s/co ratio 21 1006    AFP  27.3 ng/mL 21 1039    Group B Strep  Negative  09/10/21 0938    GBS Susceptibility to Clindamycin       GBS Susceptibility to Erythromycin       Fetal Fibronectin       Genetic Testing, Maternal Blood             Drug Screening     Test Value Date Time    Urine Drug Screen       Amphetamine Screen  Negative ng/mL 21 1006    Barbiturate Screen  Negative ng/mL 21 1006    Benzodiazepine Screen  Negative ng/mL 21 1006    Methadone Screen  Negative ng/mL 21 1006    Phencyclidine Screen  Negative ng/mL 21 1006    Opiates Screen       THC Screen       Cocaine Screen       Propoxyphene Screen  Negative ng/mL 21 1006    Buprenorphine Screen       Methamphetamine Screen       Oxycodone Screen       Tricyclic Antidepressants Screen             Legend    ^: Historical                         Past OB History:     OB History    Para Term  AB Living   1 0 0 0 0 0   SAB TAB Ectopic Molar Multiple Live Births   0 0 0 0 0 0      # Outcome Date GA Lbr Mukund/2nd Weight Sex Delivery Anes PTL Lv   1 Current                Past Medical History: History reviewed. No pertinent past medical history.   Past Surgical History Past Surgical History:   Procedure Laterality Date   • WISDOM TOOTH EXTRACTION        Family History: Family History   Problem Relation Age of Onset   • No Known Problems  Mother    • No Known Problems Father    • Diabetes Maternal Grandfather       Social History:  reports that she has never smoked. She has never used smokeless tobacco.   reports previous alcohol use.   reports no history of drug use.        General ROS: Pertinent items are noted in HPI    Objective       Vital Signs Range for the last 24 hours  Temperature: Temp:  [97.9 °F (36.6 °C)-98.1 °F (36.7 °C)] 98.1 °F (36.7 °C)   Temp Source: Temp src: Oral   BP: BP: (119-139)/(73-86) 123/78   Pulse: Heart Rate:  [] 117   Respirations: Resp:  [18-20] 20   SPO2:     O2 Amount (l/min):     O2 Devices     Weight: Weight:  [97.1 kg (214 lb)] 97.1 kg (214 lb)     Physical Examination: General appearance - alert, well appearing, and in no distress    Presentation: vtx   Cervix: Exam by:     Dilation:     Effacement:     Station:         Fetal Heart Rate Assessment   Method:     Beats/min:     Baseline:     Varibility:     Accels:     Decels:     Tracing Category:       Uterine Assessment   Method:     Frequency (min):     Ctx Count in 10 min:     Duration:     Intensity:     Intensity by IUPC:     Resting Tone:     Resting Tone by IUPC:     [place] Units:       Laboratory Results:   Radiology Review:   Other Studies:     Assessment/Plan       Normal labor        Assessment:  1.  Intrauterine pregnancy at 39w6d weeks gestation with reactive fetal status.    2.  labor  3.  Obstetrical history significant for is non-contributory.  4.  GBS status: No results found for: GBSANTIGEN    Plan:  1. Augment as needed  2. Plan of care has been reviewed with patient and family  3.  Risks, benefits of treatment plan have been discussed.  4.  All questions have been answered.  5.        Noel Garcia MD  10/8/2021  10:23 EDT

## 2021-10-08 NOTE — LACTATION NOTE
10/08/21 1800   Maternal Information   Person Making Referral other (see comments)  (Courtesy visit, new pt to floor. Baby in nursery)   Maternal Reason for Referral other (see comments)  (Discussed importance of feeding cues & no longer than 3 hrs)   Infant Reason for Referral other (see comments)  (Reports baby nursed well in nursery)   Milk Expression/Equipment   Breast Pump Type double electric, personal

## 2021-10-08 NOTE — L&D DELIVERY NOTE
[facility]  Vaginal Delivery Note    Delivery     Delivery: Vaginal, Vacuum (Extractor)     YOB: 2023    Time of Birth:  Gestational Age 3:36 PM   39w6d     Anesthesia: Epidural     Delivering clinician: Noel Garcia    Forceps?   No   Vacuum? No    Shoulder dystocia present: No        Delivery narrative: Patient delivered vaginally over no episiotomy under epidural a male, baby suctioned cord milked 4 times and cord cut and clamped baby handed off to nurses.  Cord blood obtained placenta expressed uterus explored a second-degree vaginal tear was repaired with 2-0 chromic running lock stitch.  Blood loss was 150 cc baby and mom doing well.    Infant    Findings: male  infant     Infant observations: Weight: 3075 g (6 lb 12.5 oz)   Length: 19.5  in  Observations/Comments:        Apgars: 8  @ 1 minute /    9  @ 5 minutes   Infant Name:      Placenta, Cord, and Fluid    Placenta delivered  Spontaneous  at   10/8/2021  3:40 PM     Cord: 3 vessels  present.   Nuchal Cord?  no   Cord blood obtained: Yes    Cord gases obtained:  No    Cord gas results: Venous:  No results found for: PHCVEN    Arterial:  No results found for: PHCART     Repair    Episiotomy: None     No    Lacerations: Yes  Laceration Information  Laceration Repaired?   Perineal: 2nd  Yes    Periurethral:       Labial:       Sulcus:       Vaginal:       Cervical:         Suture used for repair: 2-0 chromic gut   Estimated Blood Loss: Est. Blood Loss (mL): 400 mL (Filed from Delivery Summary) (10/08/21 1536)           Complications  none    Disposition  Mother to Mother Baby/Postpartum  in stable condition currently.  Baby to NBN  in stable condition currently.      Noel Garcia MD  10/08/21  17:11 EDT

## 2021-10-09 LAB
BASOPHILS # BLD AUTO: 0.02 10*3/MM3 (ref 0–0.2)
BASOPHILS NFR BLD AUTO: 0.2 % (ref 0–1.5)
DEPRECATED RDW RBC AUTO: 43.7 FL (ref 37–54)
EOSINOPHIL # BLD AUTO: 0.04 10*3/MM3 (ref 0–0.4)
EOSINOPHIL NFR BLD AUTO: 0.3 % (ref 0.3–6.2)
ERYTHROCYTE [DISTWIDTH] IN BLOOD BY AUTOMATED COUNT: 13.1 % (ref 12.3–15.4)
HCT VFR BLD AUTO: 37.8 % (ref 34–46.6)
HGB BLD-MCNC: 12.3 G/DL (ref 12–15.9)
IMM GRANULOCYTES # BLD AUTO: 0.08 10*3/MM3 (ref 0–0.05)
IMM GRANULOCYTES NFR BLD AUTO: 0.6 % (ref 0–0.5)
LYMPHOCYTES # BLD AUTO: 2.71 10*3/MM3 (ref 0.7–3.1)
LYMPHOCYTES NFR BLD AUTO: 20.3 % (ref 19.6–45.3)
MCH RBC QN AUTO: 29.9 PG (ref 26.6–33)
MCHC RBC AUTO-ENTMCNC: 32.5 G/DL (ref 31.5–35.7)
MCV RBC AUTO: 91.7 FL (ref 79–97)
MONOCYTES # BLD AUTO: 0.82 10*3/MM3 (ref 0.1–0.9)
MONOCYTES NFR BLD AUTO: 6.2 % (ref 5–12)
NEUTROPHILS NFR BLD AUTO: 72.4 % (ref 42.7–76)
NEUTROPHILS NFR BLD AUTO: 9.65 10*3/MM3 (ref 1.7–7)
NRBC BLD AUTO-RTO: 0 /100 WBC (ref 0–0.2)
PLATELET # BLD AUTO: 109 10*3/MM3 (ref 140–450)
PMV BLD AUTO: 12.8 FL (ref 6–12)
RBC # BLD AUTO: 4.12 10*6/MM3 (ref 3.77–5.28)
WBC # BLD AUTO: 13.32 10*3/MM3 (ref 3.4–10.8)

## 2021-10-09 PROCEDURE — 0503F POSTPARTUM CARE VISIT: CPT | Performed by: OBSTETRICS & GYNECOLOGY

## 2021-10-09 PROCEDURE — 85025 COMPLETE CBC W/AUTO DIFF WBC: CPT | Performed by: OBSTETRICS & GYNECOLOGY

## 2021-10-09 RX ADMIN — DOCUSATE SODIUM 100 MG: 100 CAPSULE, LIQUID FILLED ORAL at 08:35

## 2021-10-09 RX ADMIN — IBUPROFEN 600 MG: 600 TABLET ORAL at 02:30

## 2021-10-09 RX ADMIN — IBUPROFEN 600 MG: 600 TABLET ORAL at 15:50

## 2021-10-09 RX ADMIN — DOCUSATE SODIUM 100 MG: 100 CAPSULE, LIQUID FILLED ORAL at 21:55

## 2021-10-09 NOTE — LACTATION NOTE
10/09/21 0955   Maternal Information   Person Making Referral   (fu consult)   Infant Reason for Referral   (has had a feeding & formula 2x)   Milk Expression/Equipment   Breast Pump Type double electric, personal  (will get personal pump from home)   Breast Pumping   Breast Pumping Interventions   (can pump for missed feedings when pump here)   Teaching done as documented under Education. Encouraged as much skin to skin as possible. Discussed nipple shield use, if baby having trouble at breast after bottles. To call lactation services, if there are questions or concerns or if mom wants help with a feeding.

## 2021-10-09 NOTE — PROGRESS NOTES
Postpartum Progress Note    Patient name: Desiree Cheung  YOB: 1990   MRN: [id number]  Referring Provider: Ginna Isaacs*  Admission Date: 10/8/2021  Date of Service: 10/9/2021    ID: [id number] y.o.     Diagnosis:   S/p vaginal delivery   Patient Active Problem List   Diagnosis   • Elevated blood pressure reading in office with white coat syndrome, without diagnosis of hypertension   • Prenatal care, subsequent pregnancy, second trimester   • 32 weeks gestation of pregnancy   • Elevated blood pressure affecting pregnancy in third trimester, antepartum   • 35 weeks gestation of pregnancy   • Normal labor       Subjective:      No complaints.  Moderate lochia.  Ambulating, voiding, tolerating diet.  Pain well controlled.  The patient is currently breastfeeding.   This baby is a boy and they desire circumcision.    Objective:      Vital signs:  Vital Signs Range for the last 24 hours  Temperature: Temp:  [97.6 °F (36.4 °C)-98.8 °F (37.1 °C)] 97.6 °F (36.4 °C)   Temp Source: Temp src: Oral   BP: BP: (103-139)/(55-86) 131/58   Pulse: Heart Rate:  [] 75   Respirations: Resp:  [16-20] 16   Weight: 97.1 kg (214 lb)     General: Alert & oriented x4, in no apparent distress  Abdomen: soft, nontender  Uterus: firm, nontender  Extremities: nontender; no edema      Labs:  Lab Results   Component Value Date    WBC 13.32 (H) 10/09/2021    HGB 12.3 10/09/2021    HCT 37.8 10/09/2021    MCV 91.7 10/09/2021     (L) 10/09/2021     Results from last 7 days   Lab Units 10/08/21  0541   ABO TYPING  A   RH TYPING  Positive     External Prenatal Results     Pregnancy Outside Results - Transcribed From Office Records - See Scanned Records For Details     Test Value Date Time    ABO  A  10/08/21 0541    Rh  Positive  10/08/21 0541    Antibody Screen  Negative  10/08/21 0457       Negative  21 0950       Negative  21 1006    Varicella IgG       Rubella  3.65 index 21 1006    Hgb  12.3  g/dL 10/09/21 0722       14.5 g/dL 10/08/21 0457       14.7 g/dL 10/07/21 1054       14.3 g/dL 09/10/21 0950       13.3 g/dL 07/30/21 0950       13.4 g/dL 03/12/21 1006    Hct  37.8 % 10/09/21 0722       42.2 % 10/08/21 0457       44.5 % 10/07/21 1054       43.6 % 09/10/21 0950       39.1 % 07/30/21 0950       40.4 % 03/12/21 1006    Glucose Fasting GTT       Glucose Tolerance Test 1 hour       Glucose Tolerance Test 3 hour       Gonorrhea (discrete)  Negative  03/12/21 1006    Chlamydia (discrete)  Negative  03/12/21 1006    RPR  Non Reactive  03/12/21 1006    VDRL       Syphilis Antibody       HBsAg  Negative  03/12/21 1006    Herpes Simplex Virus PCR       Herpes Simplex VIrus Culture       HIV  Non Reactive  03/12/21 1006    Hep C RNA Quant PCR       Hep C Antibody  <0.1 s/co ratio 03/12/21 1006    AFP  27.3 ng/mL 04/23/21 1039    Group B Strep  Negative  09/10/21 0938    GBS Susceptibility to Clindamycin       GBS Susceptibility to Erythromycin       Fetal Fibronectin       Genetic Testing, Maternal Blood             Drug Screening     Test Value Date Time    Urine Drug Screen       Amphetamine Screen  Negative ng/mL 03/12/21 1006    Barbiturate Screen  Negative ng/mL 03/12/21 1006    Benzodiazepine Screen  Negative ng/mL 03/12/21 1006    Methadone Screen  Negative ng/mL 03/12/21 1006    Phencyclidine Screen  Negative ng/mL 03/12/21 1006    Opiates Screen       THC Screen       Cocaine Screen       Propoxyphene Screen  Negative ng/mL 03/12/21 1006    Buprenorphine Screen       Methamphetamine Screen       Oxycodone Screen       Tricyclic Antidepressants Screen             Legend    ^: Historical                        Assessment/Plan:      PPD#1 s/p vaginal delivery.  1. S/p vaginal delivery: Doing well.Continue routine postpartum care.    2. Infant feeding: Supportive care.  The patient is currently breastfeeding.  3. Contraception: Education given regarding options for contraception, including barrier  methods, injectable contraception, IUD placement, oral contraceptives.    Arben Echols MD  10/09/21

## 2021-10-09 NOTE — ANESTHESIA POSTPROCEDURE EVALUATION
Patient: Desiree Cheung    Procedure Summary     Date: 10/08/21 Room / Location:     Anesthesia Start: 0957 Anesthesia Stop: 1540    Procedure: LABOR ANALGESIA Diagnosis:     Scheduled Providers:  Provider: Siddhartha Stallings MD    Anesthesia Type: epidural ASA Status: 2 - Emergent          Anesthesia Type: epidural    Vitals  Vitals Value Taken Time   /58 10/09/21 0800   Temp 97.6 °F (36.4 °C) 10/09/21 0800   Pulse 75 10/09/21 0800   Resp 16 10/09/21 0800   SpO2             Post Anesthesia Care and Evaluation    Patient location during evaluation: bedside  Patient participation: complete - patient participated  Level of consciousness: awake and awake and alert  Pain score: 0  Pain management: satisfactory to patient  Airway patency: patent  Anesthetic complications: No anesthetic complications  PONV Status: none  Cardiovascular status: acceptable, hemodynamically stable and stable  Respiratory status: acceptable  Hydration status: stable  Post Neuraxial Block status: Motor and sensory function returned to baseline and No signs or symptoms of PDPH

## 2021-10-09 NOTE — LACTATION NOTE
10/09/21 1615   Maternal Information   Person Making Referral nurse; patient   Maternal Reason for Referral   (first time breastfeeder)   Infant Reason for Referral   (baby has been breastfeeding pretty well)   Maternal Assessment   Breast Shape Bilateral:; wide; angled   Breast Density Bilateral:; soft   Nipples Bilateral:; graspable   Left Nipple Symptoms intact   Right Nipple Symptoms intact   Maternal Infant Feeding   Maternal Emotional State receptive; tense   Infant Positioning cross-cradle  (right)   Signs of Milk Transfer deep jaw excursions noted   Pain with Feeding no   Comfort Measures Before/During Feeding infant position adjusted; latch adjusted; maternal position adjusted   Latch Assistance minimal assistance   Support Person Involvement verbally supports mother   Milk Expression/Equipment   Breast Pump Type double electric, personal  (demonstrated personal medela pump)   Breast Pump Flange Type hard   Breast Pump Flange Size 24 mm   Breast Pumping   Breast Pumping Interventions   (pump for missed feedings)

## 2021-10-10 VITALS
SYSTOLIC BLOOD PRESSURE: 108 MMHG | TEMPERATURE: 98 F | WEIGHT: 214 LBS | HEART RATE: 74 BPM | RESPIRATION RATE: 18 BRPM | HEIGHT: 70 IN | DIASTOLIC BLOOD PRESSURE: 71 MMHG | BODY MASS INDEX: 30.64 KG/M2

## 2021-10-10 PROCEDURE — 0503F POSTPARTUM CARE VISIT: CPT | Performed by: OBSTETRICS & GYNECOLOGY

## 2021-10-10 RX ORDER — IBUPROFEN 600 MG/1
600 TABLET ORAL EVERY 6 HOURS PRN
Qty: 30 TABLET | Refills: 1 | Status: SHIPPED | OUTPATIENT
Start: 2021-10-10

## 2021-10-10 RX ADMIN — DOCUSATE SODIUM 100 MG: 100 CAPSULE, LIQUID FILLED ORAL at 07:59

## 2021-10-10 NOTE — DISCHARGE INSTRUCTIONS
Vaginal Delivery    Vaginal delivery means that you give birth by pushing your baby out of your birth canal (vagina). A team of health care providers will help you before, during, and after vaginal delivery. Birth experiences are unique for every woman and every pregnancy, and birth experiences vary depending on where you choose to give birth.  What happens when I arrive at the birth center or hospital?  Once you are in labor and have been admitted into the hospital or birth center, your health care provider may:  · Review your pregnancy history and any concerns that you have.  · Insert an IV into one of your veins. This may be used to give you fluids and medicines.  · Check your blood pressure, pulse, temperature, and heart rate (vital signs).  · Check whether your bag of water (amniotic sac) has broken (ruptured).  · Talk with you about your birth plan and discuss pain control options.  Monitoring  Your health care provider may monitor your contractions (uterine monitoring) and your baby's heart rate (fetal monitoring). You may need to be monitored:  · Often, but not continuously (intermittently).  · All the time or for long periods at a time (continuously). Continuous monitoring may be needed if:  ? You are taking certain medicines, such as medicine to relieve pain or make your contractions stronger.  ? You have pregnancy or labor complications.  Monitoring may be done by:  · Placing a special stethoscope or a handheld monitoring device on your abdomen to check your baby's heartbeat and to check for contractions.  · Placing monitors on your abdomen (external monitors) to record your baby's heartbeat and the frequency and length of contractions.  · Placing monitors inside your uterus through your vagina (internal monitors) to record your baby's heartbeat and the frequency, length, and strength of your contractions. Depending on the type of monitor, it may remain in your uterus or on your baby's head until  birth.  · Telemetry. This is a type of continuous monitoring that can be done with external or internal monitors. Instead of having to stay in bed, you are able to move around during telemetry.  Physical exam  Your health care provider may perform frequent physical exams. This may include:  · Checking how and where your baby is positioned in your uterus.  · Checking your cervix to determine:  ? Whether it is thinning out (effacing).  ? Whether it is opening up (dilating).  What happens during labor and delivery?    Normal labor and delivery is divided into the following three stages:  Stage 1  · This is the longest stage of labor.  · This stage can last for hours or days.  · Throughout this stage, you will feel contractions. Contractions generally feel mild, infrequent, and irregular at first. They get stronger, more frequent (about every 2-3 minutes), and more regular as you move through this stage.  · This stage ends when your cervix is completely dilated to 4 inches (10 cm) and completely effaced.  Stage 2  · This stage starts once your cervix is completely effaced and dilated and lasts until the delivery of your baby.  · This stage may last from 20 minutes to 2 hours.  · This is the stage where you will feel an urge to push your baby out of your vagina.  · You may feel stretching and burning pain, especially when the widest part of your baby's head passes through the vaginal opening ().  · Once your baby is delivered, the umbilical cord will be clamped and cut. This usually occurs after waiting a period of 1-2 minutes after delivery.  · Your baby will be placed on your bare chest (skin-to-skin contact) in an upright position and covered with a warm blanket. Watch your baby for feeding cues, like rooting or sucking, and help the baby to your breast for his or her first feeding.  Stage 3  · This stage starts immediately after the birth of your baby and ends after you deliver the placenta.  · This stage may  take anywhere from 5 to 30 minutes.  · After your baby has been delivered, you will feel contractions as your body expels the placenta and your uterus contracts to control bleeding.  What can I expect after labor and delivery?  · After labor is over, you and your baby will be monitored closely until you are ready to go home to ensure that you are both healthy. Your health care team will teach you how to care for yourself and your baby.  · You and your baby will stay in the same room (rooming in) during your hospital stay. This will encourage early bonding and successful breastfeeding.  · You may continue to receive fluids and medicines through an IV.  · Your uterus will be checked and massaged regularly (fundal massage).  · You will have some soreness and pain in your abdomen, vagina, and the area of skin between your vaginal opening and your anus (perineum).  · If an incision was made near your vagina (episiotomy) or if you had some vaginal tearing during delivery, cold compresses may be placed on your episiotomy or your tear. This helps to reduce pain and swelling.  · You may be given a squirt bottle to use instead of wiping when you go to the bathroom. To use the squirt bottle, follow these steps:  ? Before you urinate, fill the squirt bottle with warm water. Do not use hot water.  ? After you urinate, while you are sitting on the toilet, use the squirt bottle to rinse the area around your urethra and vaginal opening. This rinses away any urine and blood.  ? Fill the squirt bottle with clean water every time you use the bathroom.  · It is normal to have vaginal bleeding after delivery. Wear a sanitary pad for vaginal bleeding and discharge.  Summary  · Vaginal delivery means that you will give birth by pushing your baby out of your birth canal (vagina).  · Your health care provider may monitor your contractions (uterine monitoring) and your baby's heart rate (fetal monitoring).  · Your health care provider may  perform a physical exam.  · Normal labor and delivery is divided into three stages.  · After labor is over, you and your baby will be monitored closely until you are ready to go home.  This information is not intended to replace advice given to you by your health care provider. Make sure you discuss any questions you have with your health care provider.  Document Revised: 01/22/2019 Document Reviewed: 01/22/2019  Elsevier Patient Education © 2021 [employer]

## 2021-10-10 NOTE — DISCHARGE SUMMARY
Discharge Summary    Date of Admission: 10/8/2021  Date of Discharge:  10/10/2021      Patient: Desiree Cheung      MR#:[id number]    Delivery Provider: Noel Garcia     Discharge Surgeon/OB: Dr. Arben Echols    Presenting Problem/History of Present Illness  Normal labor [O80, Z37.9]     Patient Active Problem List    Diagnosis    • Normal labor [O80, Z37.9]    • 35 weeks gestation of pregnancy [Z3A.35]    • Elevated blood pressure affecting pregnancy in third trimester, antepartum [O16.3]    • 32 weeks gestation of pregnancy [Z3A.32]    • Prenatal care, subsequent pregnancy, second trimester [Z34.82]    • Elevated blood pressure reading in office with white coat syndrome, without diagnosis of hypertension [R03.0]          Discharge Diagnosis: Vaginal delivery at 39w6d    Procedures:  Vaginal, Vacuum (Extractor)     10/8/2021    3:36 PM        Discharge Date: 10/10/2021;     Hospital Course  Patient is a 30 y.o. female  at 39w6d status post vaginal delivery without complication. Postpartum the patient did well. She remained afebrile, with vital signs stable. She was ready for discharge on postpartum day 2.     Infant:   male  fetus 3075 g (6 lb 12.5 oz)  with Apgar scores of 8 , 9  at five minutes.    Condition on Discharge:  Stable    Vital Signs  Temp:  [98 °F (36.7 °C)-98.2 °F (36.8 °C)] 98 °F (36.7 °C)  Heart Rate:  [74-78] 74  Resp:  [16-18] 18  BP: (108-119)/(68-71) 108/71    Lab Results   Component Value Date    WBC 13.32 (H) 10/09/2021    HGB 12.3 10/09/2021    HCT 37.8 10/09/2021    MCV 91.7 10/09/2021     (L) 10/09/2021       Discharge Disposition  Home or Self Care    Discharge Medications     Discharge Medications      New Medications      Instructions Start Date   ibuprofen 600 MG tablet  Commonly known as: ADVIL,MOTRIN   600 mg, Oral, Every 6 Hours PRN         Continue These Medications      Instructions Start Date   aspirin 81 MG chewable tablet   81 mg, Oral, Daily      prenatal  vitamin 27-0.8 27-0.8 MG tablet tablet   1 tablet, Oral, Daily             Discharge Diet:   regular  Activity at Discharge:   Pelvic rest  Follow-up Appointments  Future Appointments   Date Time Provider Department Center   10/11/2021  3:30 PM [facility] [address] [facility]   10/11/2021  3:40 PM Ginna Isaacs MD [facility]         Arben Echols MD  10/10/21  10:15 EDT  [facility]

## 2021-11-19 ENCOUNTER — OFFICE VISIT (OUTPATIENT)
Dept: OBSTETRICS AND GYNECOLOGY | Facility: CLINIC | Age: 31
End: 2021-11-19

## 2021-11-19 VITALS
BODY MASS INDEX: 29.35 KG/M2 | HEIGHT: 70 IN | SYSTOLIC BLOOD PRESSURE: 110 MMHG | DIASTOLIC BLOOD PRESSURE: 72 MMHG | WEIGHT: 205 LBS

## 2021-11-19 PROCEDURE — 0503F POSTPARTUM CARE VISIT: CPT | Performed by: OBSTETRICS & GYNECOLOGY

## 2021-12-10 ENCOUNTER — OFFICE VISIT (OUTPATIENT)
Dept: OBSTETRICS AND GYNECOLOGY | Facility: CLINIC | Age: 31
End: 2021-12-10

## 2021-12-10 VITALS — BODY MASS INDEX: 30.28 KG/M2 | WEIGHT: 211 LBS | SYSTOLIC BLOOD PRESSURE: 138 MMHG | DIASTOLIC BLOOD PRESSURE: 90 MMHG

## 2021-12-10 DIAGNOSIS — Z01.419 ROUTINE GYNECOLOGICAL EXAMINATION: Primary | ICD-10-CM

## 2021-12-10 PROCEDURE — 99395 PREV VISIT EST AGE 18-39: CPT | Performed by: OBSTETRICS & GYNECOLOGY

## 2021-12-10 NOTE — PROGRESS NOTES
GYN Annual Exam     CC - Here for annual exam    Subjective   HPI  Desiree Cheung is a 31 y.o. female, , who presents for annual well woman exam.   2021.  Periods are regular every 25-35 days, lasting 4 days. The patient uses 1 of tampons/pads per hour..    Dysmenorrhea:none.  Patient reports problems with: none.    Partner Status: Marital Status: .  New Partners since last visit: no.  Desires STD Screening: no.  HPV vaccinated? : no    Additional OB/GYN History   Current contraception: contraceptive methods: None  Desires to: thinking about  contraception    Last Pap : 2010 neg   Last Completed Pap Smear          PAP SMEAR (Every 3 Years) Next due on 12/10/2024    12/10/2021  Done              History of abnormal Pap smear: no  Family history of uterine, colon, breast, or ovarian cancer: no  Performs monthly Self-Breast Exam: yes  Exercises Regularly:yes  Feelings of Anxiety or Depression: no  Tobacco Usage?: No   OB History        1    Para   1    Term   1       0    AB   0    Living   1       SAB   0    IAB   0    Ectopic   0    Molar   0    Multiple   0    Live Births   1                Health Maintenance   Topic Date Due   • ANNUAL PHYSICAL  Never done   • COVID-19 Vaccine (1) Never done   • INFLUENZA VACCINE  Never done   • Annual Gynecologic Pelvic and Breast Exam  2022   • PAP SMEAR  12/10/2024   • TDAP/TD VACCINES (2 - Td or Tdap) 2031   • HEPATITIS C SCREENING  Completed   • Pneumococcal Vaccine 0-64  Aged Out       The additional following portions of the patient's history were reviewed and updated as appropriate: allergies, current medications, past family history, past medical history, past social history, past surgical history and problem list.    Review of Systems    I have reviewed and agree with the HPI, ROS, and historical information as entered above. Ginna Isaacs MD    Objective   /90   Wt 95.7 kg (211 lb)   LMP 2021   BMI  30.28 kg/m²     Physical Exam    General:  well developed; well nourished  no acute distress  Skin:  No suspicious lesions seen  Thyroid: normal to inspection and palpation  Breasts:  Examined in supine position  Symmetric without masses or skin dimpling  Nipples normal without inversion, lesions or discharge  There are no palpable axillary nodes  CVS: RRR, no M/R/G, distal pulses wnl  Resp: CTAB, No W/R/R  Abdomen: soft, non-tender; no masses  no umbilical or inguinal hernias are present  no hepato-splenomegaly  Pelvis: Clinical staff was present for exam  External genitalia:  normal appearance of the external genitalia including Bartholin's and Shaw's glands.  Urethra: no masses or tenderness  Urethral meatus: normal size;  No lesions or signs of prolapse  Bladder: non tender to palpation, no masses, no prolapse  Vagina:  normal pink mucosa without prolapse or lesions.  Cervix:  normal appearance.  Uterus:  normal size, shape and consistency.  Adnexa:  normal bimanual exam of the adnexa.  Perineum/Anus: normal appearance, no external hemorrhoids  Ext: 2+ pulses, no edema    Assessment/Plan     Assessment     Problem List Items Addressed This Visit     Routine gynecological examination - Primary    Relevant Orders    Liquid-based Pap Smear, Screening          1. GYN annual well woman exam.     Plan     1. Reviewed Pap screening guidelines  2. Pap with HPV done/declines STD screening  3. Reviewed monthly self breast exams.  Instructed to call with lumps, pain, or breast discharge.    4. Reviewed exercise and healthy diet as a preventative health measures.   5. RTC in 1 year or PRN with problems  6. Other: reviewed risks/benefits of contraception.  Patient wants to hold off for now.      Ginna Isaacs MD  12/10/2021

## 2021-12-18 PROBLEM — Z01.419 ROUTINE GYNECOLOGICAL EXAMINATION: Status: ACTIVE | Noted: 2021-12-18
